# Patient Record
Sex: FEMALE | Race: WHITE | Employment: PART TIME | URBAN - NONMETROPOLITAN AREA
[De-identification: names, ages, dates, MRNs, and addresses within clinical notes are randomized per-mention and may not be internally consistent; named-entity substitution may affect disease eponyms.]

---

## 2017-01-04 DIAGNOSIS — Z30.09 FAMILY PLANNING: Primary | ICD-10-CM

## 2017-01-04 RX ORDER — IBUPROFEN 800 MG/1
800 TABLET, FILM COATED ORAL EVERY 8 HOURS PRN
Qty: 90 TABLET | Refills: 0 | Status: SHIPPED | OUTPATIENT
Start: 2017-01-04 | End: 2017-08-19

## 2017-01-04 NOTE — TELEPHONE ENCOUNTER
Last office visit 11/08/16.  Ibuprofen historically entered on patients current medication list. Please advise. Medication pended.

## 2017-01-11 ENCOUNTER — OFFICE VISIT (OUTPATIENT)
Dept: PEDIATRICS | Facility: OTHER | Age: 27
End: 2017-01-11
Attending: INTERNAL MEDICINE
Payer: COMMERCIAL

## 2017-01-11 VITALS
DIASTOLIC BLOOD PRESSURE: 60 MMHG | TEMPERATURE: 97.7 F | WEIGHT: 185 LBS | OXYGEN SATURATION: 98 % | BODY MASS INDEX: 28.97 KG/M2 | HEART RATE: 90 BPM | SYSTOLIC BLOOD PRESSURE: 100 MMHG | RESPIRATION RATE: 20 BRPM

## 2017-01-11 DIAGNOSIS — M76.60 PAIN IN ACHILLES TENDON: ICD-10-CM

## 2017-01-11 DIAGNOSIS — M25.571 PAIN IN JOINT, ANKLE AND FOOT, RIGHT: Primary | ICD-10-CM

## 2017-01-11 LAB
CRP SERPL-MCNC: 3.5 MG/L (ref 0–8)
ERYTHROCYTE [SEDIMENTATION RATE] IN BLOOD BY WESTERGREN METHOD: 10 MM/H (ref 0–20)

## 2017-01-11 PROCEDURE — 86140 C-REACTIVE PROTEIN: CPT | Performed by: INTERNAL MEDICINE

## 2017-01-11 PROCEDURE — 85652 RBC SED RATE AUTOMATED: CPT | Performed by: INTERNAL MEDICINE

## 2017-01-11 PROCEDURE — 99213 OFFICE O/P EST LOW 20 MIN: CPT | Performed by: INTERNAL MEDICINE

## 2017-01-11 PROCEDURE — 36415 COLL VENOUS BLD VENIPUNCTURE: CPT | Performed by: INTERNAL MEDICINE

## 2017-01-11 ASSESSMENT — PAIN SCALES - GENERAL: PAINLEVEL: NO PAIN (1)

## 2017-01-11 NOTE — PROGRESS NOTES
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  She recently had an injection into her right foot which made her pain improve over her plantar region.  She now reports 8/10 pain at the achilles with sqeezing the tendon or trying to walk.  She denies any recent antibiotics or any antibiotics in the past 6 months.  Additionally, she reports that her skin has been sensitive to touch.  Additionally, she reports current cancer sores in her mouth.    Past Medical History   Diagnosis Date     Behcet's disease (H)      Past Surgical History   Procedure Laterality Date     Excise mass back Left 3/7/2016     Procedure: EXCISE MASS BACK;  Surgeon: Safia Garza MD;  Location: [facility]       Review of Systems   All other systems reviewed and are negative.        Physical Exam   Constitutional: She is oriented to person, place, and time and well-developed, well-nourished, and in no distress. No distress.   Cardiovascular:   Pulses:       Radial pulses are 2+ on the right side, and 2+ on the left side.        Popliteal pulses are 2+ on the right side, and 2+ on the left side.        Dorsalis pedis pulses are 2+ on the right side, and 2+ on the left side.        Posterior tibial pulses are 2+ on the right side, and 2+ on the left side.   Musculoskeletal: She exhibits no edema.        Right ankle: Tenderness. No lateral malleolus and no medial malleolus tenderness found. Achilles tendon exhibits pain.        Left ankle: Normal. No lateral malleolus and no medial malleolus tenderness found. Achilles tendon exhibits no pain.   Neurological: She is alert and oriented to person, place, and time.     Labs:  Results for orders placed or performed in visit on 01/11/17   ESR: Erythrocyte sedimentation rate   Result Value Ref [facility]    Sed Rate 10 0 - 20 mm/h   CRP inflammation   Result Value Ref [place]    CRP Inflammation 3.5 0.0 - 8.0 mg/L           Imaging:  To be done at [facility]  ruiz with diagnostic ultrasound.      ASSESSMENT /PLAN:    (M25.571) Pain in joint, ankle and foot, right  (primary encounter diagnosis)  Comment: Possible achilles tear vs posterior tibialis tear vs another posterior tibial tendon tear.  Plan:   US Lower Extremity PPG      (M76.60) Pain in Achilles tendon  Comment:   Plan:   US Lower Extremity PPG        Follow up with Provider - BOB Mckeon DO

## 2017-01-11 NOTE — NURSING NOTE
"Chief Complaint   Patient presents with     Musculoskeletal Problem     right ankle sore- tendon sore- states skin \"hurts\" had cortisone shots in foot last month and pain feels better in foot, but now moved to ankle tendon. states pain 8-9 when walking, when sitting maybe 1 for pain       Initial /60 mmHg  Pulse 90  Temp(Src) 97.7  F (36.5  C) (Tympanic)  Resp 20  Wt 185 lb (83.915 kg)  SpO2 98%  LMP 05/17/2016 (Approximate) Estimated body mass index is 28.97 kg/(m^2) as calculated from the following:    Height as of 11/15/16: 5' 7\" (1.702 m).    Weight as of this encounter: 185 lb (83.915 kg).  BP completed using cuff size: kassandra Bethea LPN      "

## 2017-01-12 ENCOUNTER — TELEPHONE (OUTPATIENT)
Dept: PEDIATRICS | Facility: OTHER | Age: 27
End: 2017-01-12

## 2017-01-12 NOTE — TELEPHONE ENCOUNTER
Faxed ultrasound orders to [facility]/Dr. Smart at [phone]. Faxed order, demo, med list, referral order

## 2017-01-26 ENCOUNTER — TELEPHONE (OUTPATIENT)
Dept: FAMILY MEDICINE | Facility: OTHER | Age: 27
End: 2017-01-26

## 2017-01-26 NOTE — TELEPHONE ENCOUNTER
Writer faxed US ankle order to [facility] on 1/12/17. Writer called [facility] to verify that they received the order and was advised that Dr. Smart goes to [facility] a limited amount and will be reviewing her information today. Patient should expect a phone call from [facility] to be scheduled by the end of day today.

## 2017-01-26 NOTE — TELEPHONE ENCOUNTER
9:26 AM    Reason for Call: Phone Call    Description: Pt called because at the last appt 01/11/2017 an ultra sound on her right ankle was discussed within the appt.  However, no call was made to schedule the appt.  Pt is wondering when to expect a call.  Nurse, please advise.     Was an appointment offered for this call? No    Preferred method for responding to this message: Telephone Call: [phone]    If we cannot reach you directly, may we leave a detailed response at the number you provided? Yes    Can this message wait until your PCP/provider returns, if available today? Not applicable, PCP Dr. Mckeon is in today.    Yuliya Rebolledo

## 2017-02-02 ENCOUNTER — HOSPITAL ENCOUNTER (EMERGENCY)
Facility: HOSPITAL | Age: 27
Discharge: HOME | End: 2017-02-02
Attending: INTERNAL MEDICINE | Admitting: INTERNAL MEDICINE
Payer: COMMERCIAL

## 2017-02-02 VITALS
HEIGHT: 67 IN | WEIGHT: 180 LBS | DIASTOLIC BLOOD PRESSURE: 63 MMHG | TEMPERATURE: 98.3 F | BODY MASS INDEX: 28.25 KG/M2 | SYSTOLIC BLOOD PRESSURE: 95 MMHG | RESPIRATION RATE: 16 BRPM | OXYGEN SATURATION: 97 %

## 2017-02-02 DIAGNOSIS — N83.201 CYST OF RIGHT OVARY: ICD-10-CM

## 2017-02-02 LAB
ALBUMIN SERPL-MCNC: 3.2 G/DL (ref 3.4–5)
ALBUMIN UR-MCNC: NEGATIVE MG/DL
ALP SERPL-CCNC: 52 U/L (ref 40–150)
ALT SERPL W P-5'-P-CCNC: 23 U/L (ref 0–50)
AMYLASE SERPL-CCNC: 41 U/L (ref 30–110)
ANION GAP SERPL CALCULATED.3IONS-SCNC: 8 MMOL/L (ref 3–14)
APPEARANCE UR: CLEAR
AST SERPL W P-5'-P-CCNC: 10 U/L (ref 0–45)
BASOPHILS # BLD AUTO: 0 10E9/L (ref 0–0.2)
BASOPHILS NFR BLD AUTO: 0.2 %
BILIRUB SERPL-MCNC: 0.2 MG/DL (ref 0.2–1.3)
BILIRUB UR QL STRIP: NEGATIVE
BUN SERPL-MCNC: 10 MG/DL (ref 7–30)
CALCIUM SERPL-MCNC: 8.1 MG/DL (ref 8.5–10.1)
CHLORIDE SERPL-SCNC: 111 MMOL/L (ref 94–109)
CO2 SERPL-SCNC: 23 MMOL/L (ref 20–32)
COLOR UR AUTO: YELLOW
CREAT SERPL-MCNC: 0.66 MG/DL (ref 0.52–1.04)
CRP SERPL-MCNC: 4.2 MG/L (ref 0–8)
DIFFERENTIAL METHOD BLD: NORMAL
EOSINOPHIL # BLD AUTO: 0.1 10E9/L (ref 0–0.7)
EOSINOPHIL NFR BLD AUTO: 0.7 %
ERYTHROCYTE [DISTWIDTH] IN BLOOD BY AUTOMATED COUNT: 13 % (ref 10–15)
GFR SERPL CREATININE-BSD FRML MDRD: ABNORMAL ML/MIN/1.7M2
GLUCOSE SERPL-MCNC: 89 MG/DL (ref 70–99)
GLUCOSE UR STRIP-MCNC: NEGATIVE MG/DL
HCG UR QL: NEGATIVE
HCT VFR BLD AUTO: 39 % (ref 35–47)
HGB BLD-MCNC: 13.1 G/DL (ref 11.7–15.7)
HGB UR QL STRIP: NEGATIVE
IMM GRANULOCYTES # BLD: 0 10E9/L (ref 0–0.4)
IMM GRANULOCYTES NFR BLD: 0.4 %
KETONES UR STRIP-MCNC: NEGATIVE MG/DL
LEUKOCYTE ESTERASE UR QL STRIP: NEGATIVE
LIPASE SERPL-CCNC: 105 U/L (ref 73–393)
LYMPHOCYTES # BLD AUTO: 1.9 10E9/L (ref 0.8–5.3)
LYMPHOCYTES NFR BLD AUTO: 17.8 %
MCH RBC QN AUTO: 29.3 PG (ref 26.5–33)
MCHC RBC AUTO-ENTMCNC: 33.6 G/DL (ref 31.5–36.5)
MCV RBC AUTO: 87 FL (ref 78–100)
MONOCYTES # BLD AUTO: 0.7 10E9/L (ref 0–1.3)
MONOCYTES NFR BLD AUTO: 6.1 %
MUCOUS THREADS #/AREA URNS LPF: PRESENT /LPF
NEUTROPHILS # BLD AUTO: 8 10E9/L (ref 1.6–8.3)
NEUTROPHILS NFR BLD AUTO: 74.8 %
NITRATE UR QL: NEGATIVE
NRBC # BLD AUTO: 0 10*3/UL
NRBC BLD AUTO-RTO: 0 /100
PH UR STRIP: 5.5 PH (ref 4.7–8)
PLATELET # BLD AUTO: 234 10E9/L (ref 150–450)
POTASSIUM SERPL-SCNC: 3.9 MMOL/L (ref 3.4–5.3)
PROT SERPL-MCNC: 7.2 G/DL (ref 6.8–8.8)
RBC # BLD AUTO: 4.47 10E12/L (ref 3.8–5.2)
RBC #/AREA URNS AUTO: 3 /HPF (ref 0–2)
SODIUM SERPL-SCNC: 142 MMOL/L (ref 133–144)
SP GR UR STRIP: 1.02 (ref 1–1.03)
URN SPEC COLLECT METH UR: ABNORMAL
UROBILINOGEN UR STRIP-MCNC: NORMAL MG/DL (ref 0–2)
WBC # BLD AUTO: 10.8 10E9/L (ref 4–11)
WBC #/AREA URNS AUTO: 1 /HPF (ref 0–2)

## 2017-02-02 PROCEDURE — 80053 COMPREHEN METABOLIC PANEL: CPT | Performed by: INTERNAL MEDICINE

## 2017-02-02 PROCEDURE — 36415 COLL VENOUS BLD VENIPUNCTURE: CPT | Performed by: INTERNAL MEDICINE

## 2017-02-02 PROCEDURE — 82150 ASSAY OF AMYLASE: CPT | Performed by: INTERNAL MEDICINE

## 2017-02-02 PROCEDURE — 83690 ASSAY OF LIPASE: CPT | Performed by: INTERNAL MEDICINE

## 2017-02-02 PROCEDURE — 25000128 H RX IP 250 OP 636: Performed by: INTERNAL MEDICINE

## 2017-02-02 PROCEDURE — 85025 COMPLETE CBC W/AUTO DIFF WBC: CPT | Performed by: INTERNAL MEDICINE

## 2017-02-02 PROCEDURE — 81001 URINALYSIS AUTO W/SCOPE: CPT | Performed by: INTERNAL MEDICINE

## 2017-02-02 PROCEDURE — 96375 TX/PRO/DX INJ NEW DRUG ADDON: CPT

## 2017-02-02 PROCEDURE — 86140 C-REACTIVE PROTEIN: CPT | Performed by: INTERNAL MEDICINE

## 2017-02-02 PROCEDURE — 96361 HYDRATE IV INFUSION ADD-ON: CPT

## 2017-02-02 PROCEDURE — 99285 EMERGENCY DEPT VISIT HI MDM: CPT | Performed by: INTERNAL MEDICINE

## 2017-02-02 PROCEDURE — 96374 THER/PROPH/DIAG INJ IV PUSH: CPT

## 2017-02-02 PROCEDURE — 74177 CT ABD & PELVIS W/CONTRAST: CPT | Mod: TC

## 2017-02-02 PROCEDURE — 99285 EMERGENCY DEPT VISIT HI MDM: CPT | Mod: 25

## 2017-02-02 PROCEDURE — 81025 URINE PREGNANCY TEST: CPT | Performed by: INTERNAL MEDICINE

## 2017-02-02 RX ORDER — KETOROLAC TROMETHAMINE 30 MG/ML
30 INJECTION, SOLUTION INTRAMUSCULAR; INTRAVENOUS ONCE
Status: COMPLETED | OUTPATIENT
Start: 2017-02-02 | End: 2017-02-02

## 2017-02-02 RX ORDER — ONDANSETRON 2 MG/ML
4 INJECTION INTRAMUSCULAR; INTRAVENOUS ONCE
Status: COMPLETED | OUTPATIENT
Start: 2017-02-02 | End: 2017-02-02

## 2017-02-02 RX ORDER — MORPHINE SULFATE 2 MG/ML
4 INJECTION, SOLUTION INTRAMUSCULAR; INTRAVENOUS ONCE
Status: COMPLETED | OUTPATIENT
Start: 2017-02-02 | End: 2017-02-02

## 2017-02-02 RX ORDER — IOPAMIDOL 612 MG/ML
100 INJECTION, SOLUTION INTRAVASCULAR ONCE
Status: COMPLETED | OUTPATIENT
Start: 2017-02-02 | End: 2017-02-02

## 2017-02-02 RX ADMIN — KETOROLAC TROMETHAMINE 30 MG: 30 INJECTION, SOLUTION INTRAMUSCULAR; INTRAVENOUS at 06:17

## 2017-02-02 RX ADMIN — ONDANSETRON 4 MG: 2 INJECTION, SOLUTION INTRAMUSCULAR; INTRAVENOUS at 04:57

## 2017-02-02 RX ADMIN — IOPAMIDOL 100 ML: 612 INJECTION, SOLUTION INTRAVASCULAR at 06:26

## 2017-02-02 RX ADMIN — MORPHINE SULFATE 4 MG: 2 INJECTION, SOLUTION INTRAMUSCULAR; INTRAVENOUS at 04:57

## 2017-02-02 RX ADMIN — SODIUM CHLORIDE 1000 ML: 9 INJECTION, SOLUTION INTRAVENOUS at 04:57

## 2017-02-02 ASSESSMENT — ENCOUNTER SYMPTOMS
PALPITATIONS: 0
FLANK PAIN: 0
VOMITING: 0
CONFUSION: 0
WHEEZING: 0
ANAL BLEEDING: 0
DIAPHORESIS: 0
SHORTNESS OF BREATH: 0
BLOOD IN STOOL: 0
DYSURIA: 0
COLOR CHANGE: 0
NAUSEA: 0
LIGHT-HEADEDNESS: 0
ABDOMINAL PAIN: 1
VOICE CHANGE: 0
HEADACHES: 0
MYALGIAS: 0
ARTHRALGIAS: 0
NECK STIFFNESS: 0
WOUND: 0
HEMATURIA: 0
CHEST TIGHTNESS: 0
FEVER: 0
ABDOMINAL DISTENTION: 0
NECK PAIN: 0
BACK PAIN: 0
DIZZINESS: 0
COUGH: 0
CHILLS: 0
NUMBNESS: 0

## 2017-02-02 NOTE — ED PROVIDER NOTES
"  History     Chief Complaint   Patient presents with     Abdominal Pain     sudden onset at 0330. c/o sharp pains. nausea and vomiting. denies dysuria      Patient is a 26 year old female presenting with abdominal pain. The history is provided by the patient.   Abdominal Pain  Pain location:  RLQ  Pain quality: sharp    Pain radiates to:  Periumbilical region  Pain severity:  Severe  Onset quality:  Sudden  Duration:  2 hours  Timing:  Constant  Chronicity:  New  Associated symptoms: no chest pain, no chills, no cough, no dysuria, no fever, no hematuria, no nausea, no shortness of breath and no vomiting          I have reviewed the Medications, Allergies, Past Medical and Surgical History, and Social History in the Epic system.    Review of Systems   Constitutional: Negative for fever, chills and diaphoresis.   HENT: Negative for voice change.    Eyes: Negative for visual disturbance.   Respiratory: Negative for cough, chest tightness, shortness of breath and wheezing.    Cardiovascular: Negative for chest pain, palpitations and leg swelling.   Gastrointestinal: Positive for abdominal pain. Negative for nausea, vomiting, blood in stool, abdominal distention and anal bleeding.   Genitourinary: Negative for dysuria, hematuria, flank pain and decreased urine volume.   Musculoskeletal: Negative for myalgias, back pain, arthralgias, gait problem, neck pain and neck stiffness.   Skin: Negative for color change, pallor, rash and wound.   Neurological: Negative for dizziness, syncope, light-headedness, numbness and headaches.   Psychiatric/Behavioral: Negative for suicidal ideas and confusion.       Physical Exam   BP: 106/66 mmHg  Heart Rate: 79  Temp: 97.7  F (36.5  C)  Resp: 18  Height: 170.2 cm (5' 7\")  Weight: 81.647 kg (180 lb)  SpO2: 98 %  Physical Exam   Constitutional: She is oriented to person, place, and time. She appears well-developed and well-nourished.   HENT:   Head: Normocephalic and atraumatic. "   Mouth/Throat: No oropharyngeal exudate.   Eyes: Conjunctivae are normal. Pupils are equal, round, and reactive to light.   Neck: Normal range of motion. Neck supple. No JVD present. No tracheal deviation present. No thyromegaly present.   Cardiovascular: Normal rate, regular rhythm, normal heart sounds and intact distal pulses.  Exam reveals no gallop and no friction rub.    No murmur heard.  Pulmonary/Chest: Effort normal and breath sounds normal. No stridor. No respiratory distress. She has no wheezes. She has no rales. She exhibits no tenderness.   Abdominal: Soft. Bowel sounds are normal. She exhibits no distension and no mass. There is tenderness in the right lower quadrant. There is no rebound and no guarding.   Musculoskeletal: Normal range of motion. She exhibits no edema or tenderness.   Lymphadenopathy:     She has no cervical adenopathy.   Neurological: She is alert and oriented to person, place, and time.   Skin: Skin is warm and dry. No rash noted. No erythema. No pallor.   Psychiatric: Her behavior is normal.   Nursing note and vitals reviewed.      ED Course   Procedures                 Labs Ordered and Resulted from Time of ED Arrival Up to the Time of Departure from the ED   ROUTINE UA WITH MICROSCOPIC REFLEX TO CULTURE - Abnormal; Notable for the following:     RBC Urine 3 (*)     Mucous Urine Present (*)     All other components within normal limits   COMPREHENSIVE METABOLIC PANEL - Abnormal; Notable for the following:     Chloride 111 (*)     Calcium 8.1 (*)     Albumin 3.2 (*)     All other components within normal limits   HCG QUALITATIVE URINE   CBC WITH PLATELETS DIFFERENTIAL   CRP INFLAMMATION   AMYLASE   LIPASE       Assessments & Plan (with Medical Decision Making)   Sudden onset of RLQ pain  CT abd vrad: R ovraian cyst  US pelvic ordered to r/o ovarian torsion  Pt refused staying longer in ER for US and asking for to follow up with her PCP  Dc home    I have reviewed the nursing  notes.    I have reviewed the findings, diagnosis, plan and need for follow up with the patient.    Discharge Medication List as of 2/2/2017  8:24 AM          Final diagnoses:   Cyst of right ovary       2/2/2017   [facility]      Fermin Tello MD  02/03/17 0218

## 2017-02-02 NOTE — ED NOTES
Condition stable no distress.  Understands discharge instructions, Ultrasound to be done on an outpatient bases.  Discharged home.

## 2017-02-02 NOTE — ED AVS SNAPSHOT
[facility]    [address] [address]    Phone:  [phone]                                       Keira Qureshi   MRN: [id number]    Department:  [facility]   Date of Visit:  2/2/2017           After Visit Summary Signature Page     I have received my discharge instructions, and my questions have been answered. I have discussed any challenges I see with this plan with the nurse or doctor.    ..........................................................................................................................................  Patient/Patient Representative Signature      ..........................................................................................................................................  Patient Representative Print Name and Relationship to Patient    ..................................................               ................................................  Date                                            Time    ..........................................................................................................................................  Reviewed by Signature/Title    ...................................................              ..............................................  Date                                                            Time

## 2017-02-02 NOTE — ED NOTES
Face to face report given with opportunity to observe patient.    Report given to Kaleigh YANES   2/2/2017  7:09 AM

## 2017-02-02 NOTE — ED AVS SNAPSHOT
[facility]    [address] [place] [address]    Phone:  [phone]                                       Keira Qureshi   MRN: [id number]    Department:  [facility]   Date of Visit:  2/2/2017           Patient Information     Date Of Birth          1990        Your diagnoses for this visit were:     Cyst of right ovary        You were seen by Fermin Tello MD.      Follow-up Information     Follow up with Peter Mckeon DO. Call today.    Specialties:  Internal Medicine, Pediatrics    Contact information:    [facility]  [address]  [facility] [id number] [phone]          Discharge Instructions         What to expect when you have contrast    During your exam, we will inject  contrast  into your vein or artery. (Contrast is a clear liquid with iodine in it. It shows up on X-rays.)    You may feel warm or hot. You may have a metal taste in your mouth and a slight upset stomach. You may also feel pressure near the kidneys and bladder. These effects will last about 1 to 3 minutes.    Please tell us if you have:    Sneezing     Itching    Hives     Swelling in the face    A hoarse voice    Breathing problems    Other new symptoms    Serious problems are rare.  They may include:    Irregular heartbeat     Seizures    Kidney failure              Tissue damage    Shock      Death    If you have any problems during the exam, we  will treat them right away.    When you get home    Call your hospital if you have any new symptoms in the next 2 days, like hives or swelling. (Phone numbers are at the bottom of this page.) Or call your family doctor.     If you have wheezing or trouble breathing, call 911.    Self-care  -Drink at least 4 extra glasses of water today.   This reduces the stress on your kidneys.  -Keep taking your regular medicines.    The contrast will pass out of your body in your  Urine(pee). This will happen in the next 24 hours.  You  will not feel this. Your urine will not  change color.    If you have kidney problems or take metformin    Drink 4 to 8 large glasses of water for the next  2 days, if you are not on a fluid restriction.    ?If you take metformin (Glucophage or Glucovance) for diabetes, keep taking it.      ?Your kidney function tests are abnormal.  If you take Metformin, do not take it for 48 hours. Please go to your clinic for a blood test within 3 days after your exam before the restarting this medicine.     (Note to provider:please give patient prescription for lab tests.)    ?Special instructions:     I have read and understand the above information.    Patient Sign Here:______________________________________Date:________Time:______    Staff Sign Here:________________________________________Date:_______Time:______      Radiology Departments:     ?[facility]: [phone] ?[facility]: [phone]     ?[place]: [phone] ?[facility]:[phone]      ?[facility]: [phone]  ?[facility]: [phone]  ?[facility]:[phone]    ?[facility]:[phone]  ?[facility]:[phone]  Ovarian Cysts    Ovarian cysts are sacs filled with fluid or tissue that form on or inside the ovaries. The ovaries are two small organs located on each side of a woman s uterus (womb). They are part of the female reproductive system.  Ovarian cysts are common in women, especially during childbearing years. There are different types of cysts. Most are harmless (benign) and go away on their own. They often cause no symptoms. If symptoms do occur, they can include mild pain or pressure in the lower belly (abdomen).  Cysts that are large or break (rupture) may cause more severe pain and symptoms. In these cases, hospital care or treatment such as surgery may be needed. More extensive treatment may also be needed if a cyst causes an ovary to twist (called torsion) or if a cyst is suspected to be cancerous. Keep in mind that most cysts are not  cancerous, however.  General care    To help relieve pain, your healthcare provider may recommend using over-the-counter pain medicine. If needed, stronger pain medicine may be prescribed.    Depending on the type of cyst you have, your healthcare provider may advise taking birth control pills. These help shrink cysts in certain cases. They may also help prevent new cysts from forming. Be sure to take these medicines as directed if they are prescribed.    Your healthcare provider may advise you to watch your symptoms over time to see if they go away or worsen. Regular ultrasound tests may also be advised. These can help check if a cyst goes away or grows in size.  Follow-up care  Follow up with your healthcare provider, or as advised.  When to seek medical advice  Call your healthcare provider right away if any of these occur:    Pain worsens or fails to get better with home treatment    Fever of 100.4 F (38 C) or higher (or other fever amount directed by your healthcare provider)    Nausea and vomiting    Weakness, dizziness, or fainting    Abnormal vaginal bleeding    5628-8206 [employer]. [address] [place] [address]. All rights reserved. This information is not intended as a substitute for professional medical care. Always follow your healthcare professional's instructions.          Future Appointments        Provider Department Dept Phone Center    4/19/2017 2:30 PM Umm Han MD, MD [facility] [phone] Christine Cedillo         Review of your medicines      Our records show that you are taking the medicines listed below. If these are incorrect, please call your family doctor or clinic.        Dose / Directions Last dose taken    ALPRAZolam 0.5 MG tablet   Commonly known as:  XANAX   Dose:  0.5 mg   Quantity:  40 tablet        Take 1 tablet (0.5 mg) by mouth 3 times daily as needed for anxiety   Refills:  0        etonogestrel 68 MG Impl   Commonly known as:   IMPLANON/NEXPLANON   Dose:  1 each        1 each (68 mg) by Subdermal route continuous   Refills:  0        ibuprofen 800 MG tablet   Commonly known as:  ADVIL/MOTRIN   Dose:  800 mg   Quantity:  90 tablet        Take 1 tablet (800 mg) by mouth every 8 hours as needed   Refills:  0        predniSONE 20 MG tablet   Commonly known as:  DELTASONE   Quantity:  12 tablet        Take 2 tables by mouth for two days, then take one tablet by mouth for 5 days, then 1/2 tablet for 5 days   Refills:  0                Procedures and tests performed during your visit     Abd/pelvis CT - IV contrast only TRAUMA / AAA    Amylase    CBC with platelets differential    CRP inflammation    Comprehensive metabolic panel    HCG qualitative urine    Lipase    UA with Microscopic reflex to Culture      Orders Needing Specimen Collection     None      Pending Results     Date and Time Order Name Status Description    2/2/2017 0602 Abd/pelvis CT - IV contrast only TRAUMA / AAA In process             Pending Culture Results     No orders found from 2/1/2017 to 2/3/2017.            Thank you for choosing [facility]       Thank you for choosing [facility] for your care. Our goal is always to provide you with excellent care. Hearing back from our patients is one way we can continue to improve our services. Please take a few minutes to complete the written survey that you may receive in the mail after you visit with us. Thank you!        [employer] Information     [employer] gives you secure access to your electronic health record. If you see a primary care provider, you can also send messages to your care team and make appointments. If you have questions, please call your primary care clinic.  If you do not have a primary care provider, please call [phone] and they will assist you.        Care EveryWhere ID     This is your Care EveryWhere ID. This could be used by other organizations to access your [facility] medical records  [id number]        After  Visit Summary       This is your record. Keep this with you and show to your community pharmacist(s) and doctor(s) at your next visit.

## 2017-02-02 NOTE — ED NOTES
Patient presents to emergency room with complaints of abdominal pain. States the pain woke her up at 0330. Sharp pain left lower quadrant. Nausea, emesis x 1. Rates pain 10/10 tearful. Abdomen tender on palpation. Afebrile. Denies dysuria. Denies vaginal bleeding or discharge.

## 2017-02-02 NOTE — DISCHARGE INSTRUCTIONS
What to expect when you have contrast    During your exam, we will inject  contrast  into your vein or artery. (Contrast is a clear liquid with iodine in it. It shows up on X-rays.)    You may feel warm or hot. You may have a metal taste in your mouth and a slight upset stomach. You may also feel pressure near the kidneys and bladder. These effects will last about 1 to 3 minutes.    Please tell us if you have:    Sneezing     Itching    Hives     Swelling in the face    A hoarse voice    Breathing problems    Other new symptoms    Serious problems are rare.  They may include:    Irregular heartbeat     Seizures    Kidney failure              Tissue damage    Shock      Death    If you have any problems during the exam, we  will treat them right away.    When you get home    Call your hospital if you have any new symptoms in the next 2 days, like hives or swelling. (Phone numbers are at the bottom of this page.) Or call your family doctor.     If you have wheezing or trouble breathing, call 911.    Self-care  -Drink at least 4 extra glasses of water today.   This reduces the stress on your kidneys.  -Keep taking your regular medicines.    The contrast will pass out of your body in your  Urine(pee). This will happen in the next 24 hours. You  will not feel this. Your urine will not  change color.    If you have kidney problems or take metformin    Drink 4 to 8 large glasses of water for the next  2 days, if you are not on a fluid restriction.    ?If you take metformin (Glucophage or Glucovance) for diabetes, keep taking it.      ?Your kidney function tests are abnormal.  If you take Metformin, do not take it for 48 hours. Please go to your clinic for a blood test within 3 days after your exam before the restarting this medicine.     (Note to provider:please give patient prescription for lab tests.)    ?Special instructions:     I have read and understand the above information.    Patient Sign  Here:______________________________________Date:________Time:______    Staff Sign Here:________________________________________Date:_______Time:______      Radiology Departments:     ?[facility]: [phone] ?[facility]: [phone]     ?[place]: [phone] ?[facility]:[phone]      ?[facility]: [phone]  ?[facility]: [phone]  ?[facility]:[phone]    ?[facility]:[phone]  ?[facility]:[phone]  Ovarian Cysts    Ovarian cysts are sacs filled with fluid or tissue that form on or inside the ovaries. The ovaries are two small organs located on each side of a woman s uterus (womb). They are part of the female reproductive system.  Ovarian cysts are common in women, especially during childbearing years. There are different types of cysts. Most are harmless (benign) and go away on their own. They often cause no symptoms. If symptoms do occur, they can include mild pain or pressure in the lower belly (abdomen).  Cysts that are large or break (rupture) may cause more severe pain and symptoms. In these cases, hospital care or treatment such as surgery may be needed. More extensive treatment may also be needed if a cyst causes an ovary to twist (called torsion) or if a cyst is suspected to be cancerous. Keep in mind that most cysts are not cancerous, however.  General care    To help relieve pain, your healthcare provider may recommend using over-the-counter pain medicine. If needed, stronger pain medicine may be prescribed.    Depending on the type of cyst you have, your healthcare provider may advise taking birth control pills. These help shrink cysts in certain cases. They may also help prevent new cysts from forming. Be sure to take these medicines as directed if they are prescribed.    Your healthcare provider may advise you to watch your symptoms over time to see if they go away or worsen. Regular ultrasound tests may also be advised. These can help check if a cyst goes away or grows in  size.  Follow-up care  Follow up with your healthcare provider, or as advised.  When to seek medical advice  Call your healthcare provider right away if any of these occur:    Pain worsens or fails to get better with home treatment    Fever of 100.4 F (38 C) or higher (or other fever amount directed by your healthcare provider)    Nausea and vomiting    Weakness, dizziness, or fainting    Abnormal vaginal bleeding    8507-3639 [employer]. [address] [place] [address], [place] [id number]. All rights reserved. This information is not intended as a substitute for professional medical care. Always follow your healthcare professional's instructions.

## 2017-02-08 ENCOUNTER — HOSPITAL ENCOUNTER (OUTPATIENT)
Dept: ULTRASOUND IMAGING | Facility: HOSPITAL | Age: 27
Discharge: HOME | End: 2017-02-08
Attending: INTERNAL MEDICINE | Admitting: INTERNAL MEDICINE
Payer: COMMERCIAL

## 2017-02-08 ENCOUNTER — RESULTS ONLY (OUTPATIENT)
Dept: ULTRASOUND IMAGING | Facility: HOSPITAL | Age: 27
End: 2017-02-08

## 2017-02-08 PROCEDURE — 76856 US EXAM PELVIC COMPLETE: CPT | Mod: TC

## 2017-02-27 ENCOUNTER — TELEPHONE (OUTPATIENT)
Dept: OBGYN | Facility: OTHER | Age: 27
End: 2017-02-27

## 2017-02-27 DIAGNOSIS — N93.9 ABNORMAL VAGINAL BLEEDING: ICD-10-CM

## 2017-02-27 DIAGNOSIS — N93.9 ABNORMAL VAGINAL BLEEDING: Primary | ICD-10-CM

## 2017-02-27 DIAGNOSIS — Z87.59 HISTORY OF MISCARRIAGE: ICD-10-CM

## 2017-02-27 LAB
B-HCG SERPL-ACNC: <1 IU/L
ERYTHROCYTE [DISTWIDTH] IN BLOOD BY AUTOMATED COUNT: 13 % (ref 10–15)
HCT VFR BLD AUTO: 38.9 % (ref 35–47)
HGB BLD-MCNC: 13.1 G/DL (ref 11.7–15.7)
MCH RBC QN AUTO: 29.5 PG (ref 26.5–33)
MCHC RBC AUTO-ENTMCNC: 33.7 G/DL (ref 31.5–36.5)
MCV RBC AUTO: 88 FL (ref 78–100)
PLATELET # BLD AUTO: 221 10E9/L (ref 150–450)
RBC # BLD AUTO: 4.44 10E12/L (ref 3.8–5.2)
WBC # BLD AUTO: 9.3 10E9/L (ref 4–11)

## 2017-02-27 PROCEDURE — 85027 COMPLETE CBC AUTOMATED: CPT | Performed by: OBSTETRICS & GYNECOLOGY

## 2017-02-27 PROCEDURE — 84702 CHORIONIC GONADOTROPIN TEST: CPT | Performed by: OBSTETRICS & GYNECOLOGY

## 2017-02-27 PROCEDURE — 36415 COLL VENOUS BLD VENIPUNCTURE: CPT | Performed by: OBSTETRICS & GYNECOLOGY

## 2017-02-27 NOTE — TELEPHONE ENCOUNTER
Discussed with Dr. Han. Per Dr. Han, instructed patient to come for bllod count and stat quant hcg. Orders done.

## 2017-02-27 NOTE — TELEPHONE ENCOUNTER
"Patient believes she is miscarrying. Had a loss in August and having same symptoms. Has not taken a pregnancy test at home. Wants to come in for Quant HCG. Started bleeding on Saturday like a period, and last night started bleeding heavier and now bleeding very heavily. Has filled a menstrual cup twice since 6am and has passed a few clots a little larger than quarter-sized. Started having terrible cramping off and on last night and pain gets up to an \"8/10.\" Denies 1-sided pelvic pain or light-headed or dizzy feeling.     Would like to do labs today. What would you like her to do?  "

## 2017-03-21 ENCOUNTER — TELEPHONE (OUTPATIENT)
Dept: PEDIATRICS | Facility: OTHER | Age: 27
End: 2017-03-21

## 2017-03-21 ENCOUNTER — OFFICE VISIT (OUTPATIENT)
Dept: CARE COORDINATION | Facility: OTHER | Age: 27
End: 2017-03-21
Attending: PODIATRIST
Payer: COMMERCIAL

## 2017-03-21 VITALS — DIASTOLIC BLOOD PRESSURE: 69 MMHG | SYSTOLIC BLOOD PRESSURE: 101 MMHG | TEMPERATURE: 99 F | HEART RATE: 77 BPM

## 2017-03-21 DIAGNOSIS — M77.8 EXTENSOR TENDONITIS OF FOOT: ICD-10-CM

## 2017-03-21 DIAGNOSIS — M79.671 PAIN OF MIDFOOT, RIGHT: Primary | ICD-10-CM

## 2017-03-21 PROCEDURE — 99212 OFFICE O/P EST SF 10 MIN: CPT | Performed by: PODIATRIST

## 2017-03-21 NOTE — TELEPHONE ENCOUNTER
1:48 PM    Reason for Call: Phone Call    Description: Pt called and stated she was suppose to have an order put in for MRI of shoulder but she never heard back regarding this. Please call her back at [phone]    Was an appointment offered for this call? No    Preferred method for responding to this message: Telephone Call    If we cannot reach you directly, may we leave a detailed response at the number you provided? Yes    Can this message wait until your PCP/provider returns, if available today? Yes, pt aware provider out today    Linda Hoffman

## 2017-03-21 NOTE — MR AVS SNAPSHOT
After Visit Summary   3/21/2017    Keira Qureshi    MRN: [id number]           Patient Information     Date Of Birth          1990        Visit Information        Provider Department      3/21/2017 11:00 AM [facility]         Follow-ups after your visit        Your next 10 appointments already scheduled     Mar 28, 2017  2:15 PM CDT   (Arrive by 2:00 PM)   Return Visit with Radha Sanchez DPM   [facility] [place] ([facility] [place] [facility]    [address]  [place] [address]   [phone]            Apr 19, 2017  2:30 PM CDT   (Arrive by 2:15 PM)   Office Visit with Umm Han MD   [facility] [place] ([facility] [place] [facility]    [address]  [place] [address]   [phone]           Bring a current list of meds and any records pertaining to this visit.  For Physicals, please bring immunization records and any forms needing to be filled out.  Please arrive 10 minutes early to complete paperwork.              Who to contact     If you have questions or need follow up information about today's clinic visit or your schedule please contact [facility] directly at [phone].  Normal or non-critical lab and imaging results will be communicated to you by MyChart, letter or phone within 4 business days after the clinic has received the results. If you do not hear from us within 7 days, please contact the clinic through Qwaqhart or phone. If you have a critical or abnormal lab result, we will notify you by phone as soon as possible.  Submit refill requests through TalkPlus or call your pharmacy and they will forward the refill request to us. Please allow 3 business days for your refill to be completed.          Additional Information About Your Visit        [employer] Information     TalkPlus lets you send messages to your doctor, view your test results, renew your prescriptions, schedule appointments and more. To sign up, go to  [email or web address] . Click on \"Log in\" on the left side of the screen, which will take you to the Welcome page. Then click on \"Sign up Now\" on the right side of the page.     You will be asked to enter the access code listed below, as well as some personal information. Please follow the directions to create your username and password.     Your access code is: [email or web address]  Expires: 2017  9:32 AM     Your access code will  in 90 days. If you need help or a new code, please call your [facility] clinic or [phone].        Care EveryWhere ID     This is your Care EveryWhere ID. This could be used by other organizations to access your [facility] medical records  [id number]        Your Vitals Were     Pulse Temperature Last Period             77 99  F (37.2  C) 2016 (Approximate)          Blood Pressure from Last 3 Encounters:   17 101/69   17 95/63   17 100/60    Weight from Last 3 Encounters:   17 81.6 kg (180 lb)   17 83.9 kg (185 lb)   11/15/16 86.2 kg (190 lb)              Today, you had the following     No orders found for display       Primary Care Provider Office Phone # Fax #    Peter Mckeon [phone]       [facility] [place] [address] [facility]  [place] [address]        Thank you!     Thank you for choosing [facility]  for your care. Our goal is always to provide you with excellent care. Hearing back from our patients is one way we can continue to improve our services. Please take a few minutes to complete the written survey that you may receive in the mail after your visit with us. Thank you!             Your Updated Medication List - Protect others around you: Learn how to safely use, store and throw away your medicines at www.disposemymeds.org.          This list is accurate as of: 3/21/17 11:59 PM.  Always use your most recent med list.                   Brand Name Dispense Instructions for use    " ALPRAZolam 0.5 MG tablet    XANAX    40 tablet    TAKE 1 TABLET BY MOUTH 3 TIMES A DAY AS NEEDED FOR ANXIETY       etonogestrel 68 MG Impl    IMPLANON/NEXPLANON     1 each (68 mg) by Subdermal route continuous       ibuprofen 800 MG tablet    ADVIL/MOTRIN    90 tablet    Take 1 tablet (800 mg) by mouth every 8 hours as needed       predniSONE 20 MG tablet    DELTASONE    12 tablet    Take 2 tables by mouth for two days, then take one tablet by mouth for 5 days, then 1/2 tablet for 5 days

## 2017-03-21 NOTE — TELEPHONE ENCOUNTER
MRI order placed by Dr. Sanchez. Diagnostic Imaging department will call patient to schedule appointment.

## 2017-03-24 ENCOUNTER — HOSPITAL ENCOUNTER (OUTPATIENT)
Dept: MRI IMAGING | Facility: HOSPITAL | Age: 27
Discharge: HOME | End: 2017-03-24
Attending: PODIATRIST | Admitting: PODIATRIST
Payer: COMMERCIAL

## 2017-03-24 PROCEDURE — 73718 MRI LOWER EXTREMITY W/O DYE: CPT | Mod: TC,RT

## 2017-03-28 ENCOUNTER — OFFICE VISIT (OUTPATIENT)
Dept: CARE COORDINATION | Facility: OTHER | Age: 27
End: 2017-03-28
Attending: PODIATRIST
Payer: COMMERCIAL

## 2017-03-28 VITALS — DIASTOLIC BLOOD PRESSURE: 66 MMHG | SYSTOLIC BLOOD PRESSURE: 100 MMHG | TEMPERATURE: 99.1 F | HEART RATE: 103 BPM

## 2017-03-28 PROCEDURE — 20600 DRAIN/INJ JOINT/BURSA W/O US: CPT | Performed by: PODIATRIST

## 2017-03-28 PROCEDURE — 20550 NJX 1 TENDON SHEATH/LIGAMENT: CPT | Mod: 59 | Performed by: PODIATRIST

## 2017-03-28 NOTE — MR AVS SNAPSHOT
"              After Visit Summary   3/28/2017    Keira Qureshi    MRN: [id number]           Patient Information     Date Of Birth          1990        Visit Information        Provider Department      3/28/2017 2:15 PM Radha Snachez DPM [facility] [place]         Follow-ups after your visit        Your next 10 appointments already scheduled     Apr 19, 2017  2:30 PM CDT   (Arrive by 2:15 PM)   Office Visit with Umm Han MD   [facility] [place] ([facility] [place] [facility]    [address]  [place] [address]   [phone]           Bring a current list of meds and any records pertaining to this visit.  For Physicals, please bring immunization records and any forms needing to be filled out.  Please arrive 10 minutes early to complete paperwork.              Who to contact     If you have questions or need follow up information about today's clinic visit or your schedule please contact [facility] directly at [phone].  Normal or non-critical lab and imaging results will be communicated to you by Uberseqhart, letter or phone within 4 business days after the clinic has received the results. If you do not hear from us within 7 days, please contact the clinic through Uberseqhart or phone. If you have a critical or abnormal lab result, we will notify you by phone as soon as possible.  Submit refill requests through FIZZA or call your pharmacy and they will forward the refill request to us. Please allow 3 business days for your refill to be completed.          Additional Information About Your Visit        Uberseqhart Information     FIZZA lets you send messages to your doctor, view your test results, renew your prescriptions, schedule appointments and more. To sign up, go to [email or web address] . Click on \"Log in\" on the left side of the screen, which will take you to the Welcome page. Then click on \"Sign up Now\" on the right side of the page.     You will be asked to enter the " access code listed below, as well as some personal information. Please follow the directions to create your username and password.     Your access code is: [email or web address]  Expires: 2017  9:32 AM     Your access code will  in 90 days. If you need help or a new code, please call your [facility] clinic or [phone].        Care EveryWhere ID     This is your Care EveryWhere ID. This could be used by other organizations to access your [facility] medical records  [id number]        Your Vitals Were     Pulse Temperature Last Period             103 99.1  F (37.3  C) 2016 (Approximate)          Blood Pressure from Last 3 Encounters:   17 100/66   17 101/69   17 95/63    Weight from Last 3 Encounters:   17 81.6 kg (180 lb)   17 83.9 kg (185 lb)   11/15/16 86.2 kg (190 lb)              Today, you had the following     No orders found for display       Primary Care Provider Office Phone # Fax #    Peter Magallanespe,  [phone]       [facility] [place] [address]  [place] [address]        Thank you!     [facility]  [facility]. Our goal is always to provide you with excellent care. Hearing back from our patients is one way we can continue to improve our services. Please take a few minutes to complete the written survey that you may receive in the mail after your visit with us. Thank you!             Your Updated Medication List - Protect others around you: Learn how to safely use, store and throw away your medicines at www.disposemymeds.org.          This list is accurate as of: 3/28/17 11:59 PM.  Always use your most recent med list.                   Brand Name Dispense Instructions for use    ALPRAZolam 0.5 MG tablet    XANAX    40 tablet    TAKE 1 TABLET BY MOUTH 3 TIMES A DAY AS NEEDED FOR ANXIETY       etonogestrel 68 MG Impl    IMPLANON/NEXPLANON     1 each (68 mg) by Subdermal route continuous        ibuprofen 800 MG tablet    ADVIL/MOTRIN    90 tablet    Take 1 tablet (800 mg) by mouth every 8 hours as needed       predniSONE 20 MG tablet    DELTASONE    12 tablet    Take 2 tables by mouth for two days, then take one tablet by mouth for 5 days, then 1/2 tablet for 5 days

## 2017-03-28 NOTE — NURSING NOTE
Dr. Sanchez in to see pt. No assistance needed.    Consent signed   Cortisone injection right foot for plantar fasciitis and midfoot enthesopathy    Supplies:   1cc kenalog   1cc dexamethasone   2cc lidocaine

## 2017-03-29 ENCOUNTER — TELEPHONE (OUTPATIENT)
Dept: INTERNAL MEDICINE | Facility: OTHER | Age: 27
End: 2017-03-29

## 2017-03-29 NOTE — TELEPHONE ENCOUNTER
7:42 AM    Reason for Call: Phone Call    Description: Keira would like a call back from Dr. Mckeon's nurse    Was an appointment offered for this call? No    Preferred method for responding to this message: [phone]    If we cannot reach you directly, may we leave a detailed response at the number you provided?  Yes        Venecia Hutchinson

## 2017-04-19 ENCOUNTER — OFFICE VISIT (OUTPATIENT)
Dept: OBGYN | Facility: OTHER | Age: 27
End: 2017-04-19
Attending: OBSTETRICS & GYNECOLOGY
Payer: COMMERCIAL

## 2017-04-19 VITALS
OXYGEN SATURATION: 97 % | SYSTOLIC BLOOD PRESSURE: 114 MMHG | BODY MASS INDEX: 31.08 KG/M2 | HEIGHT: 67 IN | WEIGHT: 198 LBS | DIASTOLIC BLOOD PRESSURE: 68 MMHG | HEART RATE: 87 BPM

## 2017-04-19 DIAGNOSIS — Z30.09 FAMILY PLANNING: Primary | ICD-10-CM

## 2017-04-19 PROBLEM — F43.23 ADJUSTMENT DISORDER WITH MIXED ANXIETY AND DEPRESSED MOOD: Status: ACTIVE | Noted: 2017-04-19

## 2017-04-19 PROCEDURE — 99213 OFFICE O/P EST LOW 20 MIN: CPT | Mod: 25 | Performed by: OBSTETRICS & GYNECOLOGY

## 2017-04-19 PROCEDURE — 87624 HPV HI-RISK TYP POOLED RSLT: CPT | Mod: 90 | Performed by: OBSTETRICS & GYNECOLOGY

## 2017-04-19 PROCEDURE — 87591 N.GONORRHOEAE DNA AMP PROB: CPT | Mod: 90 | Performed by: OBSTETRICS & GYNECOLOGY

## 2017-04-19 PROCEDURE — 99395 PREV VISIT EST AGE 18-39: CPT | Performed by: OBSTETRICS & GYNECOLOGY

## 2017-04-19 PROCEDURE — 88142 CYTOPATH C/V THIN LAYER: CPT | Performed by: OBSTETRICS & GYNECOLOGY

## 2017-04-19 PROCEDURE — 87491 CHLMYD TRACH DNA AMP PROBE: CPT | Mod: 90 | Performed by: OBSTETRICS & GYNECOLOGY

## 2017-04-19 PROCEDURE — 99000 SPECIMEN HANDLING OFFICE-LAB: CPT | Performed by: OBSTETRICS & GYNECOLOGY

## 2017-04-19 RX ORDER — NORETHINDRONE ACETATE AND ETHINYL ESTRADIOL 1MG-20(21)
KIT ORAL
Qty: 84 TABLET | Refills: 3 | Status: SHIPPED | OUTPATIENT
Start: 2017-04-19 | End: 2017-08-19

## 2017-04-19 ASSESSMENT — ANXIETY QUESTIONNAIRES
5. BEING SO RESTLESS THAT IT IS HARD TO SIT STILL: MORE THAN HALF THE DAYS
3. WORRYING TOO MUCH ABOUT DIFFERENT THINGS: NEARLY EVERY DAY
2. NOT BEING ABLE TO STOP OR CONTROL WORRYING: NEARLY EVERY DAY
1. FEELING NERVOUS, ANXIOUS, OR ON EDGE: NEARLY EVERY DAY
7. FEELING AFRAID AS IF SOMETHING AWFUL MIGHT HAPPEN: MORE THAN HALF THE DAYS
6. BECOMING EASILY ANNOYED OR IRRITABLE: NEARLY EVERY DAY
GAD7 TOTAL SCORE: 18
IF YOU CHECKED OFF ANY PROBLEMS ON THIS QUESTIONNAIRE, HOW DIFFICULT HAVE THESE PROBLEMS MADE IT FOR YOU TO DO YOUR WORK, TAKE CARE OF THINGS AT HOME, OR GET ALONG WITH OTHER PEOPLE: SOMEWHAT DIFFICULT

## 2017-04-19 ASSESSMENT — PAIN SCALES - GENERAL: PAINLEVEL: NO PAIN (0)

## 2017-04-19 ASSESSMENT — PATIENT HEALTH QUESTIONNAIRE - PHQ9: 5. POOR APPETITE OR OVEREATING: MORE THAN HALF THE DAYS

## 2017-04-19 NOTE — LETTER
[facility] [place]  [address]  [place] [address]  [phone]      April 28, 2017      Keira Qureshi  [address] [place] [address]        Dear Keira,      Thank you for coming to the [facility]. This letter is to inform you that your pap test was normal and your High Risk HPV test was negative. Please call the nurse at [phone] if you have questions pertaining to your results.          Sincerely,      Umm Han MD/Demetria MENDOZA LPN

## 2017-04-19 NOTE — PROGRESS NOTES
ANNUAL    Keira is a 26 year old  female who presents for annual exam.   yc 2.5  LC 10#4  Gdm n hpt n  Patient's last menstrual period was 2017.  Menses: y pain n Contraception nexplanon.  Planning pregnancy: n  Concerns in addition to routine health maintenance?  Depression and anxiety no SI.  GYNECOLOGIC HISTORY:   Surgery: n  History sexually transmitted infections:No STD history  Safe?  y  STI testing offered?  y  History of abnormal Pap smear: y ASCUS  Problems with sex? No sex drive  Family history of: breast CA: n   Uterine n ovarian CA: n   colon CA: n    HEALTH MAINTENANCE:  Cholesterol: (No results found for: CHOL History of abnormal lipids:   Mammo: n . History of abnormal Mammo:n   Regular Self Breast Exams: n Calcium/Vitamin D or Vit: n Exercise n    TSH: (  TSH   Date Value Ref Range Status   2016 1.83 0.40 - 4.00 mU/L Final    )  Pap; (  Lab Results   Component Value Date    PAP ASC-US 2016    PAP LSIL 2015    PAP NIL 2014    )    HISTORY:  Obstetric History       T1      TAB0   SAB0   E0   M0   L1       # Outcome Date GA Lbr Martir/2nd Weight Sex Delivery Anes PTL Lv   2             1 Term 08/15/14 40w0d / 00:53 10 lb 4.7 oz (4.669 kg) M Vag-Spont EPI N Y      Name: Sebastián      Apgar1:  8                Apgar5: 9        Past Medical History:   Diagnosis Date     Behcet's disease ([facility])      Past Surgical History:   Procedure Laterality Date     EXCISE MASS BACK Left 3/7/2016    Procedure: EXCISE MASS BACK;  Surgeon: Safia Garza MD;  Location: [facility] OR     Family History   Problem Relation Age of Onset     Hypertension Father      Nystagmus Son      Asthma Cousin      DIABETES No family hx of      Heart Failure Paternal Grandfather      HEART DISEASE Brother      SVT     Hypertension Brother      Social History     Social History     Marital status: Single     Spouse name: N/A     Number of children: N/A     Years of education: N/A     Social  History Main Topics     Smoking status: Former Smoker     Packs/day: 1.00     Years: 10.00     Quit date: 7/1/2013     Smokeless tobacco: Never Used     Alcohol use No     Drug use: No     Sexual activity: Yes     Partners: Male     Other Topics Concern     Parent/Sibling W/ Cabg, Mi Or Angioplasty Before 65f 55m? No      Service No     Blood Transfusions Yes     Permits if needed     Caffeine Concern No     Occupational Exposure No     Hobby Hazards No     Sleep Concern No     Stress Concern No     Weight Concern No     Special Diet No     Back Care No     Exercise No     Seat Belt Yes     Social History Narrative       Current Outpatient Prescriptions:      ALPRAZolam (XANAX) 0.5 MG tablet, TAKE 1 TABLET BY MOUTH 3 TIMES A DAY AS NEEDED FOR ANXIETY, Disp: 40 tablet, Rfl: 0     ibuprofen (ADVIL/MOTRIN) 800 MG tablet, Take 1 tablet (800 mg) by mouth every 8 hours as needed, Disp: 90 tablet, Rfl: 0     etonogestrel (IMPLANON/NEXPLANON) 68 MG IMPL, 1 each (68 mg) by Subdermal route continuous, Disp: , Rfl:      Allergies   Allergen Reactions     Tylenol W/Codeine [Acetaminophen-Codeine]        Past medical, surgical, social and family history were reviewed and updated in EPIC.    ROS:    C:     NEGATIVE for fever, chills, change in weight  I:       NEGATIVE for worrisome rashes, moles or lesions  E:     NEGATIVE for vision changes or irritation  E/M: NEGATIVE for ear, mouth and throat problems  R:     NEGATIVE for significant cough or SOB  CV:   NEGATIVE for chest pain, palpitations or peripheral edema  GI:     NEGATIVE for nausea, abdominal pain, heartburn, or change in bowel habits  :   NEGATIVE for frequency, dysuria, hematuria, vaginal discharge, or irregular bleeding,incontinence   M:     NEGATIVE for significant arthralgias or myalgia  N:      NEGATIVE for weakness, dizziness or paresthesias  E:      NEGATIVE for temperature intolerance, skin/hair changes  P:      NEGATIVE for changes in mood or  "affect.     EXAM:   /68  Pulse 87  Ht 5' 7\" (1.702 m)  Wt 198 lb (89.8 kg)  LMP 03/22/2017  SpO2 97%  BMI 31.01 kg/m2   BMI: Body mass index is 31.01 kg/(m^2).  Constitutional: healthy, alert and no distress  Head: Normocephalic. No masses, lesions, tenderness or abnormalities  Neck: Neck supple. Trachea midline. No adenopathy. Thyroid symmetric, normal size.   Cardiovascular: RRR.   Respiratory: lungs clear   Breast: Breasts reveal mild symmetric fibrocystic densities, but there are no dominant, discrete, fixed or suspicious masses found.  Gastrointestinal: Abdomen soft, non-tender, non-distended. No masses, organomegaly.  :  Vulva:  No external lesions, normal female hair distribution, no inguinal adenopathy.    Urethra:  Midline, non-tender, well supported, no discharge  Vagina:  Moist, pink, no abnormal discharge, no lesions  Cervix: clean   Uterus:   Normal size,  , non-tender, freely mobile  Ovaries:  No masses appreciated  Rectal Exam: not done  Musculoskeletal: extremities normal  Skin: no suspicious lesions or rashes  Psychiatric: Affect appropriate, cooperative,mentation appears normal.     COUNSELING:   regular exercise  healthy diet/nutrition  Immunizations   contraception  family planning  Folic Acid Counseling     reports that she quit smoking about 3 years ago. She has a 10.00 pack-year smoking history. She has never used smokeless tobacco.  Tobacco Cessation Action Plan: n  Body mass index is 31.01 kg/(m^2).  Weight management plan: no  FRAX Risk Assessment    ASSESSMENT:  26 year old female with satisfactory annual exam  With bleeding on nexplanon  Depression and anxiety    PLAN:   Counselor here asap  Doesn't want to use [facility] anymore  Pap,gc,ct  CheckMIIC  rto 1 year     Greater than 15 minutes spent discussing other than annual depression,anxiety and abnormal uterine bleeding    Umm Han MD    "

## 2017-04-19 NOTE — MR AVS SNAPSHOT
After Visit Summary   4/19/2017    Keira Qureshi    MRN: [id number]           Patient Information     Date Of Birth          1990        Visit Information        Provider Department      4/19/2017 2:30 PM Umm Han MD [facility]        Today's Diagnoses     Family planning    -  1      Care Instructions    Return in 1 year for annual        Follow-ups after your visit        Additional Services     MENTAL HEALTH REFERRAL       Your provider has referred you to:counselor here    All scheduling is subject to the client's specific insurance plan & benefits, provider/location availability, and provider clinical specialities.  Please arrive 15 minutes early for your first appointment and bring your completed paperwork.    Please be aware that coverage of these services is subject to the terms and limitations of your health insurance plan.  Call member services at your health plan with any benefit or coverage questions.                  Your next 10 appointments already scheduled     Jun 29, 2017 10:30 AM CDT   (Arrive by 10:15 AM)   New Visit with NURY Farias   [facility]    [address] [place] [address] [phone]              Who to contact     If you have questions or need follow up information about today's clinic visit or your schedule please contact [facility] directly at [phone].  Normal or non-critical lab and imaging results will be communicated to you by MyChart, letter or phone within 4 business days after the clinic has received the results. If you do not hear from us within 7 days, please contact the clinic through MyChart or phone. If you have a critical or abnormal lab result, we will notify you by phone as soon as possible.  Submit refill requests through [employer] or call your pharmacy and they will forward the refill request to us. Please allow 3 business days for your refill to be  "completed.          Additional Information About Your Visit        [employer] Information     S[employer] Media lets you send messages to your doctor, view your test results, renew your prescriptions, schedule appointments and more. To sign up, go to [email or web address] . Click on \"Log in\" on the left side of the screen, which will take you to the Welcome page. Then click on \"Sign up Now\" on the right side of the page.     You will be asked to enter the access code listed below, as well as some personal information. Please follow the directions to create your username and password.     Your access code is: [email or web address]  Expires: 2017  9:32 AM     Your access code will  in 90 days. If you need help or a new code, please call your [facility] clinic or [phone].        Care EveryWhere ID     This is your Care EveryWhere ID. This could be used by other organizations to access your [facility] medical records  [id number]        Your Vitals Were     Pulse Height Last Period Pulse Oximetry BMI (Body Mass Index)       87 5' 7\" (1.702 m) 2017 97% 31.01 kg/m2        Blood Pressure from Last 3 Encounters:   17 114/68   17 100/66   17 101/69    Weight from Last 3 Encounters:   17 198 lb (89.8 kg)   17 180 lb (81.6 kg)   17 185 lb (83.9 kg)              We Performed the Following     A pap thin layer screen with  HPV - recommended age 30 - 65 years (select HPV order below)     CHLAMYDIA TRACHOMATIS PCR     MENTAL HEALTH REFERRAL     NEISSERIA GONORRHOEA PCR          Today's Medication Changes          These changes are accurate as of: 17  4:41 PM.  If you have any questions, ask your nurse or doctor.               Start taking these medicines.        Dose/Directions    norethindrone-ethinyl estradiol 1-20 MG-MCG per tablet   Commonly known as:  MICROGESTIN FE    Used for:  Family planning   Started by:  Umm Han MD        Active pill daily   Quantity:  84 tablet "   Refills:  3            Where to get your medicines      These medications were sent to [facility] [place] [place] - [address]  [address] [place] [address]     Phone:  [phone]     norethindrone-ethinyl estradiol 1-20 MG-MCG per tablet                Primary Care Provider Office Phone # Fax #    Peter Mckeon [phone]       [facility] [place] [address]  [place] [address]        Thank you!     Thank you for choosing [facility]  for your care. Our goal is always to provide you with excellent care. Hearing back from our patients is one way we can continue to improve our services. Please take a few minutes to complete the written survey that you may receive in the mail after your visit with us. Thank you!             Your Updated Medication List - Protect others around you: Learn how to safely use, store and throw away your medicines at www.disposemymeds.org.          This list is accurate as of: 4/19/17  4:41 PM.  Always use your most recent med list.                   Brand Name Dispense Instructions for use    ALPRAZolam 0.5 MG tablet    XANAX    40 tablet    TAKE 1 TABLET BY MOUTH 3 TIMES A DAY AS NEEDED FOR ANXIETY       etonogestrel 68 MG Impl    IMPLANON/NEXPLANON     1 each (68 mg) by Subdermal route continuous       ibuprofen 800 MG tablet    ADVIL/MOTRIN    90 tablet    Take 1 tablet (800 mg) by mouth every 8 hours as needed       norethindrone-ethinyl estradiol 1-20 MG-MCG per tablet    MICROGESTIN FE 1/20    84 tablet    Active pill daily

## 2017-04-19 NOTE — NURSING NOTE
"Chief Complaint   Patient presents with     Gyn Exam       Initial /68  Pulse 87  Ht 5' 7\" (1.702 m)  Wt 198 lb (89.8 kg)  LMP 03/22/2017  SpO2 97%  BMI 31.01 kg/m2 Estimated body mass index is 31.01 kg/(m^2) as calculated from the following:    Height as of this encounter: 5' 7\" (1.702 m).    Weight as of this encounter: 198 lb (89.8 kg).  Medication Reconciliation: complete       Shonna Nam      "

## 2017-04-20 DIAGNOSIS — F41.0 PANIC ATTACK: ICD-10-CM

## 2017-04-20 DIAGNOSIS — F41.1 GAD (GENERALIZED ANXIETY DISORDER): ICD-10-CM

## 2017-04-20 RX ORDER — ALPRAZOLAM 0.5 MG
0.5 TABLET ORAL 3 TIMES DAILY
Qty: 40 TABLET | Refills: 0 | Status: SHIPPED | OUTPATIENT
Start: 2017-04-20 | End: 2017-04-25

## 2017-04-20 ASSESSMENT — ANXIETY QUESTIONNAIRES: GAD7 TOTAL SCORE: 18

## 2017-04-20 ASSESSMENT — PATIENT HEALTH QUESTIONNAIRE - PHQ9: SUM OF ALL RESPONSES TO PHQ QUESTIONS 1-9: 14

## 2017-04-20 NOTE — TELEPHONE ENCOUNTER
alprazolam      Last Written Prescription Date: 3/31/17  Last Fill Quantity: 40,  # refills: 0   Last Office Visit with [facility], [facility] or [facility] prescribing provider: 1/11/17

## 2017-04-21 LAB
C TRACH DNA SPEC QL NAA+PROBE: NORMAL
N GONORRHOEA DNA SPEC QL NAA+PROBE: NORMAL
SPECIMEN SOURCE: NORMAL
SPECIMEN SOURCE: NORMAL

## 2017-04-25 DIAGNOSIS — F41.1 GAD (GENERALIZED ANXIETY DISORDER): ICD-10-CM

## 2017-04-25 DIAGNOSIS — F41.0 PANIC ATTACK: ICD-10-CM

## 2017-04-25 NOTE — TELEPHONE ENCOUNTER
xanax      Last Written Prescription Date: 4/20/17  Last Fill Quantity: 40,  # refills: 0   Last Office Visit with [facility], [facility] or [facility] prescribing provider: 1/11/17

## 2017-04-26 LAB
COPATH REPORT: NORMAL
PAP: NORMAL

## 2017-04-26 RX ORDER — ALPRAZOLAM 0.5 MG
0.5 TABLET ORAL 3 TIMES DAILY
Qty: 90 TABLET | Refills: 0 | Status: SHIPPED | OUTPATIENT
Start: 2017-04-26 | End: 2017-10-08

## 2017-04-27 LAB
FINAL DIAGNOSIS: NORMAL
HPV HR 12 DNA CVX QL NAA+PROBE: NEGATIVE
HPV16 DNA SPEC QL NAA+PROBE: NEGATIVE
HPV18 DNA SPEC QL NAA+PROBE: NEGATIVE
SPECIMEN DESCRIPTION: NORMAL

## 2017-05-17 DIAGNOSIS — M35.2 BEHCET'S DISEASE (H): ICD-10-CM

## 2017-05-17 RX ORDER — PREDNISONE 20 MG/1
TABLET ORAL
Qty: 12 TABLET | Refills: 0 | Status: SHIPPED | OUTPATIENT
Start: 2017-05-17 | End: 2017-06-21

## 2017-05-22 ENCOUNTER — TRANSFERRED RECORDS (OUTPATIENT)
Dept: HEALTH INFORMATION MANAGEMENT | Facility: CLINIC | Age: 27
End: 2017-05-22

## 2017-05-31 ENCOUNTER — TRANSFERRED RECORDS (OUTPATIENT)
Dept: HEALTH INFORMATION MANAGEMENT | Facility: CLINIC | Age: 27
End: 2017-05-31

## 2017-06-21 DIAGNOSIS — M35.2 BEHCET'S DISEASE (H): ICD-10-CM

## 2017-06-22 RX ORDER — PREDNISONE 20 MG/1
TABLET ORAL
Qty: 12 TABLET | Refills: 0 | Status: SHIPPED | OUTPATIENT
Start: 2017-06-22 | End: 2017-10-08

## 2017-08-19 ENCOUNTER — HOSPITAL ENCOUNTER (EMERGENCY)
Facility: HOSPITAL | Age: 27
Discharge: HOME | End: 2017-08-19
Attending: EMERGENCY MEDICINE | Admitting: EMERGENCY MEDICINE
Payer: COMMERCIAL

## 2017-08-19 VITALS
TEMPERATURE: 98.1 F | OXYGEN SATURATION: 100 % | RESPIRATION RATE: 20 BRPM | DIASTOLIC BLOOD PRESSURE: 71 MMHG | SYSTOLIC BLOOD PRESSURE: 121 MMHG

## 2017-08-19 DIAGNOSIS — M76.61 ACHILLES TENDINITIS OF RIGHT LOWER EXTREMITY: Primary | ICD-10-CM

## 2017-08-19 LAB
ALBUMIN SERPL-MCNC: 3.4 G/DL (ref 3.4–5)
ALP SERPL-CCNC: 46 U/L (ref 40–150)
ALT SERPL W P-5'-P-CCNC: 25 U/L (ref 0–50)
ANION GAP SERPL CALCULATED.3IONS-SCNC: 6 MMOL/L (ref 3–14)
AST SERPL W P-5'-P-CCNC: 15 U/L (ref 0–45)
BASOPHILS # BLD AUTO: 0 10E9/L (ref 0–0.2)
BASOPHILS NFR BLD AUTO: 0.3 %
BILIRUB SERPL-MCNC: 0.2 MG/DL (ref 0.2–1.3)
BUN SERPL-MCNC: 14 MG/DL (ref 7–30)
CALCIUM SERPL-MCNC: 8.2 MG/DL (ref 8.5–10.1)
CHLORIDE SERPL-SCNC: 111 MMOL/L (ref 94–109)
CK SERPL-CCNC: 113 U/L (ref 30–225)
CO2 SERPL-SCNC: 24 MMOL/L (ref 20–32)
CREAT SERPL-MCNC: 0.6 MG/DL (ref 0.52–1.04)
D DIMER PPP DDU-MCNC: <200 NG/ML D-DU (ref 0–300)
DIFFERENTIAL METHOD BLD: NORMAL
EOSINOPHIL # BLD AUTO: 0.1 10E9/L (ref 0–0.7)
EOSINOPHIL NFR BLD AUTO: 1.9 %
ERYTHROCYTE [DISTWIDTH] IN BLOOD BY AUTOMATED COUNT: 12.4 % (ref 10–15)
ERYTHROCYTE [SEDIMENTATION RATE] IN BLOOD BY WESTERGREN METHOD: 11 MM/H (ref 0–20)
GFR SERPL CREATININE-BSD FRML MDRD: >90 ML/MIN/1.7M2
GLUCOSE SERPL-MCNC: 88 MG/DL (ref 70–99)
HCT VFR BLD AUTO: 36.5 % (ref 35–47)
HGB BLD-MCNC: 12.2 G/DL (ref 11.7–15.7)
IMM GRANULOCYTES # BLD: 0 10E9/L (ref 0–0.4)
IMM GRANULOCYTES NFR BLD: 0.2 %
LYMPHOCYTES # BLD AUTO: 1.4 10E9/L (ref 0.8–5.3)
LYMPHOCYTES NFR BLD AUTO: 22.1 %
MCH RBC QN AUTO: 30.8 PG (ref 26.5–33)
MCHC RBC AUTO-ENTMCNC: 33.4 G/DL (ref 31.5–36.5)
MCV RBC AUTO: 92 FL (ref 78–100)
MONOCYTES # BLD AUTO: 0.8 10E9/L (ref 0–1.3)
MONOCYTES NFR BLD AUTO: 13.1 %
NEUTROPHILS # BLD AUTO: 3.8 10E9/L (ref 1.6–8.3)
NEUTROPHILS NFR BLD AUTO: 62.4 %
NRBC # BLD AUTO: 0 10*3/UL
NRBC BLD AUTO-RTO: 0 /100
PLATELET # BLD AUTO: 223 10E9/L (ref 150–450)
POTASSIUM SERPL-SCNC: 4 MMOL/L (ref 3.4–5.3)
PROT SERPL-MCNC: 7.1 G/DL (ref 6.8–8.8)
RBC # BLD AUTO: 3.96 10E12/L (ref 3.8–5.2)
SODIUM SERPL-SCNC: 141 MMOL/L (ref 133–144)
WBC # BLD AUTO: 6.2 10E9/L (ref 4–11)

## 2017-08-19 PROCEDURE — 96372 THER/PROPH/DIAG INJ SC/IM: CPT

## 2017-08-19 PROCEDURE — 99284 EMERGENCY DEPT VISIT MOD MDM: CPT | Performed by: EMERGENCY MEDICINE

## 2017-08-19 PROCEDURE — 36415 COLL VENOUS BLD VENIPUNCTURE: CPT | Performed by: EMERGENCY MEDICINE

## 2017-08-19 PROCEDURE — 82550 ASSAY OF CK (CPK): CPT | Performed by: EMERGENCY MEDICINE

## 2017-08-19 PROCEDURE — 85379 FIBRIN DEGRADATION QUANT: CPT | Performed by: EMERGENCY MEDICINE

## 2017-08-19 PROCEDURE — 25000128 H RX IP 250 OP 636: Performed by: EMERGENCY MEDICINE

## 2017-08-19 PROCEDURE — 80053 COMPREHEN METABOLIC PANEL: CPT | Performed by: EMERGENCY MEDICINE

## 2017-08-19 PROCEDURE — 85652 RBC SED RATE AUTOMATED: CPT | Performed by: EMERGENCY MEDICINE

## 2017-08-19 PROCEDURE — 99284 EMERGENCY DEPT VISIT MOD MDM: CPT | Mod: 25

## 2017-08-19 PROCEDURE — 73650 X-RAY EXAM OF HEEL: CPT | Mod: TC,RT

## 2017-08-19 PROCEDURE — 85025 COMPLETE CBC W/AUTO DIFF WBC: CPT | Performed by: EMERGENCY MEDICINE

## 2017-08-19 RX ORDER — IBUPROFEN 800 MG/1
800 TABLET, FILM COATED ORAL EVERY 8 HOURS PRN
Qty: 60 TABLET | Refills: 0 | Status: SHIPPED | OUTPATIENT
Start: 2017-08-19 | End: 2017-08-27

## 2017-08-19 RX ORDER — KETOROLAC TROMETHAMINE 30 MG/ML
30 INJECTION, SOLUTION INTRAMUSCULAR; INTRAVENOUS ONCE
Status: COMPLETED | OUTPATIENT
Start: 2017-08-19 | End: 2017-08-19

## 2017-08-19 RX ADMIN — KETOROLAC TROMETHAMINE 30 MG: 30 INJECTION, SOLUTION INTRAMUSCULAR; INTRAVENOUS at 07:01

## 2017-08-19 NOTE — DISCHARGE INSTRUCTIONS
Understanding Achilles Tendonitis    Achilles tendonitis is an overuse injury. It results in inflammation of the Achilles tendon. This tendon is found on the back of the ankle. It links the calf muscle to the heel bone. It helps you do pushing-off movements like running or standing on your toes.     How to say it  uh-JEANETTE-danniez ten-dun-I-tis   What causes Achilles tendonitis?  Achilles tendonitis can happen if you do an activity like running, walking, or jumping too much. This overuse can strain, or pull, the tendon. It may lead to minor tearing of the tendon. An injury to the lower leg or foot can also cause it.  If you don t warm up before taking part in sports such as basketball, you are more likely to suffer from this condition. You are also more prone to it if you do too much of such an activity too quickly. Proper training and rest can help prevent it.  Symptoms of Achilles tendonitis  The main symptom of Achilles tendonitis is pain. This pain mostly happens when you move the ankle. The tendon may also feel stiff after a period of no activity, such as sleeping. It may also become swollen. You may hear a crackling sound when you move your ankle.  Treatment for Achilles tendonitis  Symptoms often get better after starting treatment. A full recovery may take several months. Treatments include:    Rest. You should stop or change the activity that caused the injury. The tendon will then have time to heal.    Cold or heat pack. These help reduce pain and swelling.    Prescription or over-the-counter pain medicines. These help reduce pain and swelling.    Shoe inserts. These devices can reduce strain on the Achilles tendon when you move. You may then feel less pain.    Stretching and strengthening exercises. Certain exercises can help you regain flexibility and strength in your Achilles tendon.    Surgery. This option can fix the injured tendon. But you don t often need it unless other treatments don t work.     When  to call your healthcare provider   Call your healthcare provider right away if you have any of these:    Fever of 100.4 F (38 C) or higher, or as directed    Pain that gets worse    Symptoms that don t get better, or get worse    New symptoms    Date Last Reviewed: 3/10/2016    8621-9193 [employer]. [address] [place] [address], [place] [address]. All rights reserved. This information is not intended as a substitute for professional medical care. Always follow your healthcare professional's instructions.

## 2017-08-19 NOTE — ED NOTES
Discharge instructions were not given to pt as she left without them prior to Jaymie RN entering room.  Pt had 3/10 pain at lest round. Alert and oriented. MD in with pt prior to discharge and pt instructed by MD prior to discharge.

## 2017-08-19 NOTE — ED AVS SNAPSHOT
[facility]    [address] [address]    Phone:  [phone]                                       Keira Qureshi   MRN: [id number]    Department:  [facility]   Date of Visit:  8/19/2017           After Visit Summary Signature Page     I have received my discharge instructions, and my questions have been answered. I have discussed any challenges I see with this plan with the nurse or doctor.    ..........................................................................................................................................  Patient/Patient Representative Signature      ..........................................................................................................................................  Patient Representative Print Name and Relationship to Patient    ..................................................               ................................................  Date                                            Time    ..........................................................................................................................................  Reviewed by Signature/Title    ...................................................              ..............................................  Date                                                            Time

## 2017-08-19 NOTE — ED NOTES
"Has pain in right heel that travels \"long-term up my calf\". States it was so painful this morning she had to crawl to the bathroom... \"I stood up and went right to my knees!\"  "

## 2017-08-19 NOTE — ED NOTES
Dr. Gonzalez in to see pt. States scratches are healing well. Orders placed for lab. Pt cries when right heel is palpated. Report to Crista Torres RN

## 2017-08-19 NOTE — ED AVS SNAPSHOT
[facility]    [address] [place] [address]    [place] [address]    Phone:  [phone]                                       Keira Qureshi   MRN: [id number]    Department:  [facility]   Date of Visit:  8/19/2017           Patient Information     Date Of Birth          1990        Your diagnoses for this visit were:     Achilles tendinitis of right lower extremity        You were seen by Daniel Gonzalez MD.      Follow-up Information     Follow up with Peter Mckeon DO In 2 days.    Specialties:  Internal Medicine, Pediatrics    Why:  Continuity of care    Contact information:    [facility]  [address]  [place] [address]  [phone]          Discharge Instructions         Understanding Achilles Tendonitis    Achilles tendonitis is an overuse injury. It results in inflammation of the Achilles tendon. This tendon is found on the back of the ankle. It links the calf muscle to the heel bone. It helps you do pushing-off movements like running or standing on your toes.     How to say it  uh-KILL-eez ten-dun-I-tis   What causes Achilles tendonitis?  Achilles tendonitis can happen if you do an activity like running, walking, or jumping too much. This overuse can strain, or pull, the tendon. It may lead to minor tearing of the tendon. An injury to the lower leg or foot can also cause it.  If you don t warm up before taking part in sports such as basketball, you are more likely to suffer from this condition. You are also more prone to it if you do too much of such an activity too quickly. Proper training and rest can help prevent it.  Symptoms of Achilles tendonitis  The main symptom of Achilles tendonitis is pain. This pain mostly happens when you move the ankle. The tendon may also feel stiff after a period of no activity, such as sleeping. It may also become swollen. You may hear a crackling sound when you move your ankle.  Treatment for Achilles  tendonitis  Symptoms often get better after starting treatment. A full recovery may take several months. Treatments include:    Rest. You should stop or change the activity that caused the injury. The tendon will then have time to heal.    Cold or heat pack. These help reduce pain and swelling.    Prescription or over-the-counter pain medicines. These help reduce pain and swelling.    Shoe inserts. These devices can reduce strain on the Achilles tendon when you move. You may then feel less pain.    Stretching and strengthening exercises. Certain exercises can help you regain flexibility and strength in your Achilles tendon.    Surgery. This option can fix the injured tendon. But you don t often need it unless other treatments don t work.     When to call your healthcare provider   Call your healthcare provider right away if you have any of these:    Fever of 100.4 F (38 C) or higher, or as directed    Pain that gets worse    Symptoms that don t get better, or get worse    New symptoms    Date Last Reviewed: 3/10/2016    3728-7507 [employer]. [address] [place] [address]. All rights reserved. This information is not intended as a substitute for professional medical care. Always follow your healthcare professional's instructions.          Future Appointments        Provider Department Dept Phone Center    5/2/2018 1:30 PM Umm Han MD, MD [facility] [phone] [facility]         Review of your medicines      CONTINUE these medicines which may have CHANGED, or have new prescriptions. If we are uncertain of the size of tablets/capsules you have at home, strength may be listed as something that might have changed.        Dose / Directions Last dose taken    ibuprofen 800 MG tablet   Commonly known as:  ADVIL/MOTRIN   Dose:  800 mg   What changed:  reasons to take this   Quantity:  60 tablet        Take 1 tablet (800 mg) by mouth every 8 hours as needed for moderate  pain   Refills:  0          Our records show that you are taking the medicines listed below. If these are incorrect, please call your family doctor or clinic.        Dose / Directions Last dose taken    ALPRAZolam 0.5 MG tablet   Commonly known as:  XANAX   Dose:  0.5 mg   Quantity:  90 tablet        Take 1 tablet (0.5 mg) by mouth 3 times daily AS NEEDED FOR ANXIETY   Refills:  0        etonogestrel 68 MG Impl   Commonly known as:  IMPLANON/NEXPLANON   Dose:  1 each        1 each (68 mg) by Subdermal route continuous   Refills:  0        IMURAN PO   Dose:  50 mg        Take 50 mg by mouth 2 times daily   Refills:  0        predniSONE 20 MG tablet   Commonly known as:  DELTASONE   Quantity:  12 tablet        TAKE 2 TABS BY MOUTH FOR 2 DAYS, THEN TAKE 1 TAB FOR 5 DAYS, THEN TAKE 1/2 TAB FOR 5 DAYS   Refills:  0                Prescriptions were sent or printed at these locations (1 Prescription)                   [facility]  [address]   [address] [place] [address]    Telephone:  [phone]   Fax:  [phone]   Hours:                  E-Prescribed (1 of 1)         ibuprofen (ADVIL/MOTRIN) 800 MG tablet                Procedures and tests performed during your visit     CBC with platelets differential    CK total    Comprehensive metabolic panel    D-Dimer (FV Range)    Erythrocyte sedimentation rate auto    XR Calcaneus Right G/E 2 Views      Orders Needing Specimen Collection     None      Pending Results     Date and Time Order Name Status Description    8/19/2017 0743 XR Calcaneus Right G/E 2 Views In process             Pending Culture Results     No orders found from 8/17/2017 to 8/20/2017.            Thank you for choosing [facility]       Thank you for choosing [facility] for your care. Our goal is always to provide you with excellent care. Hearing back from our patients is one way we can continue to improve our services. Please take a few minutes to complete the  "written survey that you may receive in the mail after you visit with us. Thank you!        [employer] Information     [employer] lets you send messages to your doctor, view your test results, renew your prescriptions, schedule appointments and more. To sign up, go to [email or web address] . Click on \"Log in\" on the left side of the screen, which will take you to the Welcome page. Then click on \"Sign up Now\" on the right side of the page.     You will be asked to enter the access code listed below, as well as some personal information. Please follow the directions to create your username and password.     Your access code is: [email or web address]  Expires: 2017  8:37 AM     Your access code will  in 90 days. If you need help or a new code, please call your [facility] clinic or [phone].        Care EveryWhere ID     This is your Care EveryWhere ID. This could be used by other organizations to access your [facility] medical records  [id number]        Equal Access to Services     ADRIEN SCHNEIDER : Hadii cecilia banegas Sopancho, waaxda luqadaha, qaybta kaalmagisela dunbar, emir coffey . So [facility] [phone].    ATENCIÓN: Si habla español, tiene a thorpe disposición servicios gratuitos de asistencia lingüística. Llame al [phone].    We comply with applicable federal civil rights laws and [place] laws. We do not discriminate on the basis of race, color, national origin, age, disability sex, sexual orientation or gender identity.            After Visit Summary       This is your record. Keep this with you and show to your community pharmacist(s) and doctor(s) at your next visit.                  "

## 2017-08-19 NOTE — ED PROVIDER NOTES
History     Chief Complaint   Patient presents with     Arm Injury     noticed right arm redness this morning     Foot Injury     right foot is painful to walk on     HPI  Keira Qureshi is a 26 year old female who presents to the emergency department with complaints of pain in the right Achilles tendon.  Denies any trauma, strain, jumping, excessive use or any kind of injury to the right Achilles tendon.  The pain is worse when patient standing on walking.  Improved by resting.  Pain is rated as 8/10, does not radiate. denies any color change or swellings.  No fever or shortness of breath.  No recent travel.     I have reviewed the Medications, Allergies, Past Medical and Surgical History, and Social History in the Epic system.    Allergies:   Allergies   Allergen Reactions     Tylenol W/Codeine [Acetaminophen-Codeine]          No current facility-administered medications on file prior to encounter.   Current Outpatient Prescriptions on File Prior to Encounter:  ALPRAZolam (XANAX) 0.5 MG tablet Take 1 tablet (0.5 mg) by mouth 3 times daily AS NEEDED FOR ANXIETY (Patient taking differently: Take 2 mg by mouth daily AS NEEDED FOR ANXIETY)   predniSONE (DELTASONE) 20 MG tablet TAKE 2 TABS BY MOUTH FOR 2 DAYS, THEN TAKE 1 TAB FOR 5 DAYS, THEN TAKE 1/2 TAB FOR 5 DAYS   etonogestrel (IMPLANON/NEXPLANON) 68 MG IMPL 1 each (68 mg) by Subdermal route continuous       Patient Active Problem List   Diagnosis     Obesity     Behcet's disease (H)     Family planning     Encounter for routine gynecological examination     Oral aphthae     Papanicolaou smear of cervix with low grade squamous intraepithelial lesion (LGSIL)     Skin tag     Benign neoplasm of skin of trunk, except scrotum     ACP (advance care planning)     Chronic right shoulder pain     Strain of right rotator cuff capsule     Pregnancy test positive     Rh negative, maternal     MARTÍNEZ (generalized anxiety disorder)     Calcaneal spur, right     Pain in joint,  "ankle and foot, right     Adjustment disorder with mixed anxiety and depressed mood       Past Surgical History:   Procedure Laterality Date     EXCISE MASS BACK Left 3/7/2016    Procedure: EXCISE MASS BACK;  Surgeon: Safia Garza MD;  Location: [facility]       Social History   Substance Use Topics     Smoking status: Former Smoker     Packs/day: 1.00     Years: 10.00     Quit date: 7/1/2013     Smokeless tobacco: Never Used     Alcohol use No       Most Recent Immunizations   Administered Date(s) Administered     Influenza Vaccine IM 3yrs+ 4 Valent IIV4 01/22/2014     Rhogam 08/23/2016     TDAP Vaccine (Boostrix) 05/27/2014       BMI: Estimated body mass index is 31.01 kg/(m^2) as calculated from the following:    Height as of 4/19/17: 1.702 m (5' 7\").    Weight as of 4/19/17: 89.8 kg (198 lb).      Review of Systems   All other systems reviewed and are negative.      Physical Exam   BP: 121/71  Heart Rate: 82  Temp: 98.1  F (36.7  C)  Resp: 20  SpO2: 100 %  Physical Exam   Constitutional: She is oriented to person, place, and time. She appears well-developed and well-nourished. She appears distressed.   Pain distress   HENT:   Head: Atraumatic.   Mouth/Throat: Oropharynx is clear and moist. No oropharyngeal exudate.   Eyes: Pupils are equal, round, and reactive to light. No scleral icterus.   Neck: Neck supple.   Cardiovascular: Normal rate, regular rhythm, normal heart sounds and intact distal pulses.    Pulmonary/Chest: Breath sounds normal. No respiratory distress.   Abdominal: Soft. Bowel sounds are normal. There is no tenderness.   Musculoskeletal: She exhibits tenderness. She exhibits no edema.   Tenderness of the right Achilles tendon   Neurological: She is alert and oriented to person, place, and time.   Skin: Skin is warm. No rash noted. She is not diaphoretic.   Nursing note and vitals reviewed.      ED Course   Patient evaluated and laboratory tests ordered.  Started on IM Toradol for pain " control.    ED Course     Procedures         Labs Ordered and Resulted from Time of ED Arrival Up to the Time of Departure from the ED   COMPREHENSIVE METABOLIC PANEL - Abnormal; Notable for the following:        Result Value    Chloride 111 (*)     Calcium 8.2 (*)     All other components within normal limits   CBC WITH PLATELETS DIFFERENTIAL   CK TOTAL   D-DIMER (FV RANGE)   ERYTHROCYTE SEDIMENTATION RATE AUTO       Assessments & Plan (with Medical Decision Making)   Right Achilles tendinitis: Normal CBC, CMP, d-dimer, ESR, CPK.  Normal right calcaneal x-ray.  Patient's pain was relieved after IM Toradol was given.  Patient is now being discharged home on right ankle brace and Motrin 800 mg every 8 hours as needed for pain.  Advised follow-up with PCP in 2 days.  May return to ED if condition deteriorates.    I have reviewed the nursing notes.    I have reviewed the findings, diagnosis, plan and need for follow up with the patient.    New Prescriptions    IBUPROFEN (ADVIL/MOTRIN) 800 MG TABLET    Take 1 tablet (800 mg) by mouth every 8 hours as needed for moderate pain       Final diagnoses:   Achilles tendinitis of right lower extremity       8/19/2017   [facility]     Daniel Gonzalez MD  08/19/17 0823

## 2017-10-08 ENCOUNTER — APPOINTMENT (OUTPATIENT)
Dept: CT IMAGING | Facility: HOSPITAL | Age: 27
End: 2017-10-08
Attending: FAMILY MEDICINE
Payer: COMMERCIAL

## 2017-10-08 ENCOUNTER — HOSPITAL ENCOUNTER (EMERGENCY)
Facility: HOSPITAL | Age: 27
Discharge: HOME | End: 2017-10-08
Attending: FAMILY MEDICINE | Admitting: FAMILY MEDICINE
Payer: COMMERCIAL

## 2017-10-08 VITALS
HEART RATE: 79 BPM | SYSTOLIC BLOOD PRESSURE: 109 MMHG | RESPIRATION RATE: 16 BRPM | DIASTOLIC BLOOD PRESSURE: 71 MMHG | TEMPERATURE: 99.2 F | OXYGEN SATURATION: 97 %

## 2017-10-08 DIAGNOSIS — R06.02 SHORTNESS OF BREATH: ICD-10-CM

## 2017-10-08 DIAGNOSIS — M35.2 BEHCET'S DISEASE (H): ICD-10-CM

## 2017-10-08 DIAGNOSIS — R42 DIZZINESS: ICD-10-CM

## 2017-10-08 LAB
ALBUMIN SERPL-MCNC: 3.9 G/DL (ref 3.4–5)
ALBUMIN UR-MCNC: 10 MG/DL
ALP SERPL-CCNC: 60 U/L (ref 40–150)
ALT SERPL W P-5'-P-CCNC: 29 U/L (ref 0–50)
ANION GAP SERPL CALCULATED.3IONS-SCNC: 10 MMOL/L (ref 3–14)
APPEARANCE UR: CLEAR
AST SERPL W P-5'-P-CCNC: 20 U/L (ref 0–45)
BACTERIA #/AREA URNS HPF: ABNORMAL /HPF
BASOPHILS # BLD AUTO: 0 10E9/L (ref 0–0.2)
BASOPHILS NFR BLD AUTO: 0.3 %
BILIRUB SERPL-MCNC: 0.8 MG/DL (ref 0.2–1.3)
BILIRUB UR QL STRIP: NEGATIVE
BUN SERPL-MCNC: 11 MG/DL (ref 7–30)
CALCIUM SERPL-MCNC: 8.2 MG/DL (ref 8.5–10.1)
CHLORIDE SERPL-SCNC: 106 MMOL/L (ref 94–109)
CO2 SERPL-SCNC: 24 MMOL/L (ref 20–32)
COLOR UR AUTO: YELLOW
CREAT SERPL-MCNC: 0.74 MG/DL (ref 0.52–1.04)
CRP SERPL-MCNC: 5.8 MG/L (ref 0–8)
DIFFERENTIAL METHOD BLD: ABNORMAL
EOSINOPHIL # BLD AUTO: 0.1 10E9/L (ref 0–0.7)
EOSINOPHIL NFR BLD AUTO: 1.4 %
ERYTHROCYTE [DISTWIDTH] IN BLOOD BY AUTOMATED COUNT: 14 % (ref 10–15)
ERYTHROCYTE [SEDIMENTATION RATE] IN BLOOD BY WESTERGREN METHOD: 15 MM/H (ref 0–20)
GFR SERPL CREATININE-BSD FRML MDRD: >90 ML/MIN/1.7M2
GLUCOSE BLDC GLUCOMTR-MCNC: 85 MG/DL (ref 70–99)
GLUCOSE SERPL-MCNC: 96 MG/DL (ref 70–99)
GLUCOSE UR STRIP-MCNC: NEGATIVE MG/DL
HCG UR QL: NEGATIVE
HCT VFR BLD AUTO: 31.4 % (ref 35–47)
HGB BLD-MCNC: 10.3 G/DL (ref 11.7–15.7)
HGB UR QL STRIP: ABNORMAL
IMM GRANULOCYTES # BLD: 0 10E9/L (ref 0–0.4)
IMM GRANULOCYTES NFR BLD: 0.2 %
KETONES UR STRIP-MCNC: NEGATIVE MG/DL
LEUKOCYTE ESTERASE UR QL STRIP: NEGATIVE
LYMPHOCYTES # BLD AUTO: 1.9 10E9/L (ref 0.8–5.3)
LYMPHOCYTES NFR BLD AUTO: 29 %
MCH RBC QN AUTO: 34.7 PG (ref 26.5–33)
MCHC RBC AUTO-ENTMCNC: 32.8 G/DL (ref 31.5–36.5)
MCV RBC AUTO: 106 FL (ref 78–100)
MONOCYTES # BLD AUTO: 0.6 10E9/L (ref 0–1.3)
MONOCYTES NFR BLD AUTO: 8.7 %
MUCOUS THREADS #/AREA URNS LPF: PRESENT /LPF
NEUTROPHILS # BLD AUTO: 3.9 10E9/L (ref 1.6–8.3)
NEUTROPHILS NFR BLD AUTO: 60.4 %
NITRATE UR QL: NEGATIVE
NRBC # BLD AUTO: 0 10*3/UL
NRBC BLD AUTO-RTO: 0 /100
PH UR STRIP: 6.5 PH (ref 4.7–8)
PLATELET # BLD AUTO: 224 10E9/L (ref 150–450)
POTASSIUM SERPL-SCNC: 3.3 MMOL/L (ref 3.4–5.3)
PROT SERPL-MCNC: 7.5 G/DL (ref 6.8–8.8)
RBC # BLD AUTO: 2.97 10E12/L (ref 3.8–5.2)
RBC #/AREA URNS AUTO: <1 /HPF (ref 0–2)
SODIUM SERPL-SCNC: 140 MMOL/L (ref 133–144)
SOURCE: ABNORMAL
SP GR UR STRIP: 1.03 (ref 1–1.03)
UROBILINOGEN UR STRIP-MCNC: 4 MG/DL (ref 0–2)
WBC # BLD AUTO: 6.5 10E9/L (ref 4–11)
WBC #/AREA URNS AUTO: 1 /HPF (ref 0–2)

## 2017-10-08 PROCEDURE — 80053 COMPREHEN METABOLIC PANEL: CPT | Performed by: FAMILY MEDICINE

## 2017-10-08 PROCEDURE — 99285 EMERGENCY DEPT VISIT HI MDM: CPT | Performed by: FAMILY MEDICINE

## 2017-10-08 PROCEDURE — 99285 EMERGENCY DEPT VISIT HI MDM: CPT | Mod: 25

## 2017-10-08 PROCEDURE — 85652 RBC SED RATE AUTOMATED: CPT | Performed by: FAMILY MEDICINE

## 2017-10-08 PROCEDURE — 25000128 H RX IP 250 OP 636: Performed by: FAMILY MEDICINE

## 2017-10-08 PROCEDURE — 96361 HYDRATE IV INFUSION ADD-ON: CPT

## 2017-10-08 PROCEDURE — 00000146 ZZHCL STATISTIC GLUCOSE BY METER IP

## 2017-10-08 PROCEDURE — 81025 URINE PREGNANCY TEST: CPT | Performed by: FAMILY MEDICINE

## 2017-10-08 PROCEDURE — 36415 COLL VENOUS BLD VENIPUNCTURE: CPT | Performed by: FAMILY MEDICINE

## 2017-10-08 PROCEDURE — 70450 CT HEAD/BRAIN W/O DYE: CPT | Mod: TC

## 2017-10-08 PROCEDURE — 86140 C-REACTIVE PROTEIN: CPT | Performed by: FAMILY MEDICINE

## 2017-10-08 PROCEDURE — 85025 COMPLETE CBC W/AUTO DIFF WBC: CPT | Performed by: FAMILY MEDICINE

## 2017-10-08 PROCEDURE — 99284 EMERGENCY DEPT VISIT MOD MDM: CPT | Mod: 25

## 2017-10-08 PROCEDURE — 81001 URINALYSIS AUTO W/SCOPE: CPT | Performed by: FAMILY MEDICINE

## 2017-10-08 PROCEDURE — 96360 HYDRATION IV INFUSION INIT: CPT

## 2017-10-08 RX ORDER — SODIUM CHLORIDE 9 MG/ML
1000 INJECTION, SOLUTION INTRAVENOUS CONTINUOUS
Status: DISCONTINUED | OUTPATIENT
Start: 2017-10-08 | End: 2017-10-08 | Stop reason: HOSPADM

## 2017-10-08 RX ORDER — MECLIZINE HCL 12.5 MG 12.5 MG/1
12.5 TABLET ORAL 4 TIMES DAILY PRN
Qty: 30 TABLET | Refills: 0 | COMMUNITY
Start: 2017-10-08 | End: 2017-10-15

## 2017-10-08 RX ADMIN — SODIUM CHLORIDE 1000 ML: 9 INJECTION, SOLUTION INTRAVENOUS at 17:32

## 2017-10-08 ASSESSMENT — ENCOUNTER SYMPTOMS
ACTIVITY CHANGE: 1
VOMITING: 0
DIARRHEA: 0
NAUSEA: 0
DYSPHORIC MOOD: 1
WHEEZING: 0
PALPITATIONS: 1
ABDOMINAL PAIN: 0
DYSURIA: 0
FEVER: 0
FATIGUE: 1
DIZZINESS: 1
SHORTNESS OF BREATH: 1
SPEECH DIFFICULTY: 0
CONSTIPATION: 0
WEAKNESS: 1

## 2017-10-08 NOTE — ED PROVIDER NOTES
History     Chief Complaint   Patient presents with     Generalized Weakness     HPI  Keira Qureshi is a 26 year old female who came to the ED for generalized weakness.  She gets very dizzy when she stands up, had difficulty getting to the room due to an episode of dizziness en route.  She becomes short of breath with these episodes as well. She has a history of Behcet's disease and is on Dapsone for that, has chronically dry mouth, but seems to be worse than usual.  She is also on Cymbalta for her fibro, so that is additionally drying.      I have reviewed the Medications, Allergies, Past Medical and Surgical History, and Social History in the Epic system.    Allergies:   Allergies   Allergen Reactions     Morphine      Headache and nausea     Tylenol W/Codeine [Acetaminophen-Codeine]          No current facility-administered medications on file prior to encounter.   Current Outpatient Prescriptions on File Prior to Encounter:  etonogestrel (IMPLANON/NEXPLANON) 68 MG IMPL 1 each (68 mg) by Subdermal route continuous       Patient Active Problem List   Diagnosis     Obesity     Behcet's disease (H)     Family planning     Encounter for routine gynecological examination     Oral aphthae     Papanicolaou smear of cervix with low grade squamous intraepithelial lesion (LGSIL)     Skin tag     Benign neoplasm of skin of trunk, except scrotum     ACP (advance care planning)     Chronic right shoulder pain     Strain of right rotator cuff capsule     Pregnancy test positive     Rh negative, maternal     MARTÍNEZ (generalized anxiety disorder)     Calcaneal spur, right     Pain in joint, ankle and foot, right     Adjustment disorder with mixed anxiety and depressed mood       Past Surgical History:   Procedure Laterality Date     EXCISE MASS BACK Left 3/7/2016    Procedure: EXCISE MASS BACK;  Surgeon: Safia Garza MD;  Location: [facility]       Social History   Substance Use Topics     Smoking status: Former Smoker      "Packs/day: 1.00     Years: 10.00     Quit date: 7/1/2013     Smokeless tobacco: Never Used     Alcohol use No       Most Recent Immunizations   Administered Date(s) Administered     Influenza Vaccine IM 3yrs+ 4 Valent IIV4 01/22/2014     Rhogam 08/23/2016     TDAP Vaccine (Boostrix) 05/27/2014       BMI: Estimated body mass index is 31.01 kg/(m^2) as calculated from the following:    Height as of 4/19/17: 1.702 m (5' 7\").    Weight as of 4/19/17: 89.8 kg (198 lb).      Review of Systems   Constitutional: Positive for activity change and fatigue. Negative for fever.   HENT:        Dry mouth   Respiratory: Positive for shortness of breath. Negative for wheezing.    Cardiovascular: Positive for palpitations. Negative for chest pain and leg swelling.        Chronic per patient   Gastrointestinal: Negative for abdominal pain, constipation, diarrhea, nausea and vomiting.   Genitourinary: Negative for dysuria.        Urine is very concentrated.   Musculoskeletal: Positive for gait problem.        Due to dizziness and weakness.   Skin: Negative.    Neurological: Positive for dizziness and weakness. Negative for speech difficulty.   Psychiatric/Behavioral: Positive for dysphoric mood.       Physical Exam   BP: 127/76  Pulse: 79  Temp: 99.8  F (37.7  C)  Resp: 20  SpO2: 93 %  Physical Exam   Constitutional: She is oriented to person, place, and time. She appears well-developed and well-nourished. No distress.   HENT:   Head: Normocephalic and atraumatic.   Eyes: EOM are normal. Pupils are equal, round, and reactive to light.   Neck: Normal range of motion. Neck supple.   Cardiovascular: Normal rate, regular rhythm and normal heart sounds.    No murmur heard.  Pulmonary/Chest: Effort normal and breath sounds normal. No respiratory distress. She has no wheezes.   Abdominal: Soft. Bowel sounds are normal. She exhibits no distension. There is no tenderness.   Musculoskeletal: Normal range of motion.   Neurological: She is alert " and oriented to person, place, and time.   Skin: Skin is warm and dry.   Psychiatric: She has a normal mood and affect.   Nursing note and vitals reviewed.      ED Course     ED Course     Procedures        Labs Ordered and Resulted from Time of ED Arrival Up to the Time of Departure from the ED   CBC WITH PLATELETS DIFFERENTIAL - Abnormal; Notable for the following:        Result Value    RBC Count 2.97 (*)     Hemoglobin 10.3 (*)     Hematocrit 31.4 (*)      (*)     MCH 34.7 (*)     All other components within normal limits   UA MACROSCOPIC WITH REFLEX TO MICRO AND CULTURE - Abnormal; Notable for the following:     Blood Urine Trace (*)     Protein Albumin Urine 10 (*)     Urobilinogen mg/dL 4.0 (*)     Bacteria Urine Few (*)     Mucous Urine Present (*)     All other components within normal limits   COMPREHENSIVE METABOLIC PANEL - Abnormal; Notable for the following:     Potassium 3.3 (*)     Calcium 8.2 (*)     All other components within normal limits   HCG QUALITATIVE URINE   ERYTHROCYTE SEDIMENTATION RATE AUTO   CRP INFLAMMATION   GLUCOSE BY METER   VITAL SIGNS   ORTHOSTATIC BLOOD PRESSURE AND PULSE   PERIPHERAL IV CATHETER   GLUCOSE MONITOR NURSING POCT       Assessments & Plan (with Medical Decision Making)   All tests negative.  Patient informed of this.  Given that Behcet's can cause neurological sequela that would be consistent with her current symptoms, will arrange for her to have an MRI to evaluate for that.  She is sure she will be at home throughout the night in the interim.  She will see her primary care at [facility] this week for results.    I have reviewed the nursing notes.    I have reviewed the findings, diagnosis, plan and need for follow up with the patient.       New Prescriptions    MECLIZINE (ANTIVERT) 12.5 MG TABLET    Take 1 tablet (12.5 mg) by mouth 4 times daily as needed for dizziness       Final diagnoses:   Dizziness   Behcet's disease (H)   Shortness of breath        10/8/2017   [facility]     Nikki Hurst MD  10/08/17 6668

## 2017-10-08 NOTE — ED AVS SNAPSHOT
[facility]    [address] [address]    Phone:  [phone]                                       Keira Qureshi   MRN: [id number]    Department:  [facility]   Date of Visit:  10/8/2017           Patient Information     Date Of Birth          1990        Your diagnoses for this visit were:     Dizziness     Behcet's disease (H)     Shortness of breath        You were seen by Nikki Hurst MD.      Follow-up Information     Follow up with Margarette Puri MD In 3 days.    Specialty:  Family Practice    Why:  Follow up ED visit and MRI    Contact information:    [facility]  [address]  [facility] [address] [phone]        Discharge References/Attachments     DIZZINESS (VERTIGO) AND BALANCE PROBLEMS: STAYING SAFE (ENGLISH)      Future Appointments        Provider Department Dept Phone Center    5/2/2018 1:30 PM Umm Han MD, MD [facility] [phone] [facility]      ED Discharge Orders     MRI Brain w & w/o contrast       Administration of IV contrast (contrast agent, dose, and amount) will be tailored to this examination per the appropriate written protocol listed in the Protocol E-Book, or by the supervising imaging provider.   Please give patient disc to take to PCP at [facility].    Will be seeing PCP for follow up to MRI, please do asap.                     Review of your medicines      START taking        Dose / Directions Last dose taken    meclizine 12.5 MG tablet   Commonly known as:  ANTIVERT   Dose:  12.5 mg   Quantity:  30 tablet        Take 1 tablet (12.5 mg) by mouth 4 times daily as needed for dizziness   Refills:  0          Our records show that you are taking the medicines listed below. If these are incorrect, please call your family doctor or clinic.        Dose / Directions Last dose taken    DAPSONE PO   Dose:  100 mg        Take 100 mg by mouth daily   Refills:  0         DULOXETINE HCL PO   Dose:  20 mg        Take 20 mg by mouth 2 times daily   Refills:  0        etonogestrel 68 MG Impl   Commonly known as:  IMPLANON/NEXPLANON   Dose:  1 each        1 each (68 mg) by Subdermal route continuous   Refills:  0        lidocaine (viscous) 2 % solution   Commonly known as:  XYLOCAINE        Take by mouth every 2 hours as needed for moderate pain To mouth sores as needed. The patient has 4% solution.   Refills:  0                Prescriptions were sent or printed at these locations (1 Prescription)                   Other Prescriptions                Not Printed or Sent (1 of 1)         meclizine (ANTIVERT) 12.5 MG tablet                Procedures and tests performed during your visit     CBC with platelets differential    CRP inflammation    CT Head w/o Contrast    Comprehensive metabolic panel    Erythrocyte sedimentation rate auto    Glucose by meter    Glucose monitor nursing POCT    HCG qualitative urine    Orthostatic blood pressure and pulse    Peripheral IV catheter    UA reflex to Microscopic and Culture    Vital signs      Orders Needing Specimen Collection     None      Pending Results     No orders found from 10/6/2017 to 10/9/2017.            Pending Culture Results     No orders found from 10/6/2017 to 10/9/2017.            Thank you for choosing [facility]       Thank you for choosing [facility] for your care. Our goal is always to provide you with excellent care. Hearing back from our patients is one way we can continue to improve our services. Please take a few minutes to complete the written survey that you may receive in the mail after you visit with us. Thank you!        [employer] Information     [employer] gives you secure access to your electronic health record. If you see a primary care provider, you can also send messages to your care team and make appointments. If you have questions, please call your primary care clinic.  If you do not have a primary care provider, please  call [phone] and they will assist you.        Care EveryWhere ID     This is your Care EveryWhere ID. This could be used by other organizations to access your [facility] medical records  [id number]        Equal Access to Services     RADHA SCHNEIDER : Jessica Cline, demario hanson, qaestephania kaalmagisela dunbar, emir prado. So [facility] [phone].    ATENCIÓN: Si habla español, tiene a thorpe disposición servicios gratuitos de asistencia lingüística. Llame al [phone].    We comply with applicable federal civil rights laws and [place] laws. We do not discriminate on the basis of race, color, national origin, age, disability, sex, sexual orientation, or gender identity.            After Visit Summary       This is your record. Keep this with you and show to your community pharmacist(s) and doctor(s) at your next visit.

## 2017-10-08 NOTE — ED AVS SNAPSHOT
[facility]    [address] [address]    Phone:  [phone]                                       Keira Qureshi   MRN: [id number]    Department:  [facility]   Date of Visit:  10/8/2017           After Visit Summary Signature Page     I have received my discharge instructions, and my questions have been answered. I have discussed any challenges I see with this plan with the nurse or doctor.    ..........................................................................................................................................  Patient/Patient Representative Signature      ..........................................................................................................................................  Patient Representative Print Name and Relationship to Patient    ..................................................               ................................................  Date                                            Time    ..........................................................................................................................................  Reviewed by Signature/Title    ...................................................              ..............................................  Date                                                            Time

## 2017-10-08 NOTE — ED NOTES
Pt walked into room 6 with strong support of nurse, gait very unsteady. Pt states that has been that way for 2 days. C/O chest pain pointing to midsternal/lower sternal area that started today.

## 2017-10-10 ENCOUNTER — HOSPITAL ENCOUNTER (OUTPATIENT)
Dept: MRI IMAGING | Facility: HOSPITAL | Age: 27
Discharge: HOME | End: 2017-10-10
Attending: FAMILY MEDICINE | Admitting: FAMILY MEDICINE
Payer: COMMERCIAL

## 2017-10-10 DIAGNOSIS — R42 DIZZINESS: ICD-10-CM

## 2017-10-10 DIAGNOSIS — M35.2 BEHCET'S DISEASE (H): ICD-10-CM

## 2017-10-10 PROCEDURE — A9585 GADOBUTROL INJECTION: HCPCS | Performed by: RADIOLOGY

## 2017-10-10 PROCEDURE — 70553 MRI BRAIN STEM W/O & W/DYE: CPT | Mod: TC

## 2017-10-10 PROCEDURE — 25000128 H RX IP 250 OP 636: Performed by: RADIOLOGY

## 2017-10-10 RX ORDER — GADOBUTROL 604.72 MG/ML
2 INJECTION INTRAVENOUS ONCE
Status: COMPLETED | OUTPATIENT
Start: 2017-10-10 | End: 2017-10-10

## 2017-10-10 RX ORDER — GADOBUTROL 604.72 MG/ML
7.5 INJECTION INTRAVENOUS ONCE
Status: COMPLETED | OUTPATIENT
Start: 2017-10-10 | End: 2017-10-10

## 2017-10-10 RX ADMIN — GADOBUTROL 7.5 ML: 604.72 INJECTION INTRAVENOUS at 12:13

## 2017-10-10 RX ADMIN — GADOBUTROL 2 ML: 604.72 INJECTION INTRAVENOUS at 12:14

## 2017-10-15 ENCOUNTER — APPOINTMENT (OUTPATIENT)
Dept: CT IMAGING | Facility: HOSPITAL | Age: 27
End: 2017-10-15
Attending: PHYSICIAN ASSISTANT
Payer: COMMERCIAL

## 2017-10-15 ENCOUNTER — APPOINTMENT (OUTPATIENT)
Dept: GENERAL RADIOLOGY | Facility: HOSPITAL | Age: 27
End: 2017-10-15
Attending: PHYSICIAN ASSISTANT
Payer: COMMERCIAL

## 2017-10-15 ENCOUNTER — HOSPITAL ENCOUNTER (EMERGENCY)
Facility: HOSPITAL | Age: 27
Discharge: HOME | End: 2017-10-15
Attending: PHYSICIAN ASSISTANT | Admitting: PHYSICIAN ASSISTANT
Payer: COMMERCIAL

## 2017-10-15 VITALS
HEART RATE: 82 BPM | TEMPERATURE: 98.2 F | DIASTOLIC BLOOD PRESSURE: 73 MMHG | SYSTOLIC BLOOD PRESSURE: 112 MMHG | RESPIRATION RATE: 11 BRPM | OXYGEN SATURATION: 95 %

## 2017-10-15 DIAGNOSIS — R07.89 ATYPICAL CHEST PAIN: ICD-10-CM

## 2017-10-15 LAB
ALBUMIN SERPL-MCNC: 3.8 G/DL (ref 3.4–5)
ALP SERPL-CCNC: 66 U/L (ref 40–150)
ALT SERPL W P-5'-P-CCNC: 33 U/L (ref 0–50)
ANION GAP SERPL CALCULATED.3IONS-SCNC: 8 MMOL/L (ref 3–14)
AST SERPL W P-5'-P-CCNC: 26 U/L (ref 0–45)
BASOPHILS # BLD AUTO: 0 10E9/L (ref 0–0.2)
BASOPHILS NFR BLD AUTO: 0.2 %
BILIRUB SERPL-MCNC: 0.5 MG/DL (ref 0.2–1.3)
BUN SERPL-MCNC: 12 MG/DL (ref 7–30)
CALCIUM SERPL-MCNC: 8.4 MG/DL (ref 8.5–10.1)
CHLORIDE SERPL-SCNC: 108 MMOL/L (ref 94–109)
CO2 SERPL-SCNC: 22 MMOL/L (ref 20–32)
CREAT SERPL-MCNC: 0.73 MG/DL (ref 0.52–1.04)
D DIMER PPP DDU-MCNC: 225 NG/ML D-DU (ref 0–300)
DIFFERENTIAL METHOD BLD: ABNORMAL
EOSINOPHIL # BLD AUTO: 0.1 10E9/L (ref 0–0.7)
EOSINOPHIL NFR BLD AUTO: 0.9 %
ERYTHROCYTE [DISTWIDTH] IN BLOOD BY AUTOMATED COUNT: 11.9 % (ref 10–15)
GFR SERPL CREATININE-BSD FRML MDRD: >90 ML/MIN/1.7M2
GLUCOSE SERPL-MCNC: 85 MG/DL (ref 70–99)
HCT VFR BLD AUTO: 33.3 % (ref 35–47)
HGB BLD-MCNC: 11 G/DL (ref 11.7–15.7)
IMM GRANULOCYTES # BLD: 0 10E9/L (ref 0–0.4)
IMM GRANULOCYTES NFR BLD: 0.4 %
LYMPHOCYTES # BLD AUTO: 2.1 10E9/L (ref 0.8–5.3)
LYMPHOCYTES NFR BLD AUTO: 25 %
MCH RBC QN AUTO: 34.6 PG (ref 26.5–33)
MCHC RBC AUTO-ENTMCNC: 33 G/DL (ref 31.5–36.5)
MCV RBC AUTO: 105 FL (ref 78–100)
MONOCYTES # BLD AUTO: 0.7 10E9/L (ref 0–1.3)
MONOCYTES NFR BLD AUTO: 7.9 %
NEUTROPHILS # BLD AUTO: 5.6 10E9/L (ref 1.6–8.3)
NEUTROPHILS NFR BLD AUTO: 65.6 %
NRBC # BLD AUTO: 0 10*3/UL
NRBC BLD AUTO-RTO: 0 /100
PLATELET # BLD AUTO: 225 10E9/L (ref 150–450)
POTASSIUM SERPL-SCNC: 3.8 MMOL/L (ref 3.4–5.3)
PROT SERPL-MCNC: 7.7 G/DL (ref 6.8–8.8)
RBC # BLD AUTO: 3.18 10E12/L (ref 3.8–5.2)
SODIUM SERPL-SCNC: 138 MMOL/L (ref 133–144)
TROPONIN I SERPL-MCNC: <0.015 UG/L (ref 0–0.04)
WBC # BLD AUTO: 8.6 10E9/L (ref 4–11)

## 2017-10-15 PROCEDURE — 96374 THER/PROPH/DIAG INJ IV PUSH: CPT

## 2017-10-15 PROCEDURE — 36415 COLL VENOUS BLD VENIPUNCTURE: CPT | Performed by: PHYSICIAN ASSISTANT

## 2017-10-15 PROCEDURE — 80053 COMPREHEN METABOLIC PANEL: CPT | Performed by: PHYSICIAN ASSISTANT

## 2017-10-15 PROCEDURE — 99284 EMERGENCY DEPT VISIT MOD MDM: CPT | Performed by: PHYSICIAN ASSISTANT

## 2017-10-15 PROCEDURE — 25000128 H RX IP 250 OP 636: Performed by: RADIOLOGY

## 2017-10-15 PROCEDURE — 71020 XR CHEST 2 VW: CPT | Mod: TC

## 2017-10-15 PROCEDURE — 99285 EMERGENCY DEPT VISIT HI MDM: CPT | Mod: 25

## 2017-10-15 PROCEDURE — 85025 COMPLETE CBC W/AUTO DIFF WBC: CPT | Performed by: PHYSICIAN ASSISTANT

## 2017-10-15 PROCEDURE — 93005 ELECTROCARDIOGRAM TRACING: CPT

## 2017-10-15 PROCEDURE — 84484 ASSAY OF TROPONIN QUANT: CPT | Performed by: PHYSICIAN ASSISTANT

## 2017-10-15 PROCEDURE — 71275 CT ANGIOGRAPHY CHEST: CPT | Mod: TC

## 2017-10-15 PROCEDURE — 85379 FIBRIN DEGRADATION QUANT: CPT | Performed by: PHYSICIAN ASSISTANT

## 2017-10-15 PROCEDURE — 25000128 H RX IP 250 OP 636: Performed by: PHYSICIAN ASSISTANT

## 2017-10-15 RX ORDER — IOPAMIDOL 755 MG/ML
75 INJECTION, SOLUTION INTRAVASCULAR ONCE
Status: COMPLETED | OUTPATIENT
Start: 2017-10-15 | End: 2017-10-15

## 2017-10-15 RX ORDER — KETOROLAC TROMETHAMINE 30 MG/ML
30 INJECTION, SOLUTION INTRAMUSCULAR; INTRAVENOUS ONCE
Status: COMPLETED | OUTPATIENT
Start: 2017-10-15 | End: 2017-10-15

## 2017-10-15 RX ADMIN — IOPAMIDOL 75 ML: 755 INJECTION, SOLUTION INTRAVENOUS at 21:52

## 2017-10-15 RX ADMIN — KETOROLAC TROMETHAMINE 30 MG: 30 INJECTION, SOLUTION INTRAMUSCULAR at 20:31

## 2017-10-15 ASSESSMENT — ENCOUNTER SYMPTOMS
SHORTNESS OF BREATH: 0
VOMITING: 0
FEVER: 0
CHILLS: 0
PALPITATIONS: 0
ABDOMINAL PAIN: 0
NAUSEA: 0
HEADACHES: 0
BACK PAIN: 0
CHEST TIGHTNESS: 0

## 2017-10-15 NOTE — ED AVS SNAPSHOT
[facility]    [address] [address]    Phone:  [phone]                                       Keira Qureshi   MRN: [id number]    Department:  [facility]   Date of Visit:  10/15/2017           Patient Information     Date Of Birth          1990        Your diagnoses for this visit were:     Atypical chest pain        You were seen by Radha Faust PA-C.      Follow-up Information     Follow up with Margarette Puri MD.    Specialty:  Family Practice    Contact information:    [facility]  [address] [facility] [address]  [facility] [address] [phone]          Follow up with [facility].    Specialty:  EMERGENCY MEDICINE    Why:  If symptoms worsen    Contact information:    [address] [address] [phone]    Additional information:    From [facility]: Take [address]. Turn left at [address]. Turn left at the first stoplight on [address]. At the first stop sign, take a right onto [address]. Take a left into the parking lot and continue through until you reach the North enterance of the building.       From [place]: Take [address]. Take the [address] ramp towards [place]. Turn left onto [address]. Take a slight right onto [address]. Turn left at the first stoplight on [address]. At the first stop sign, take a right onto [address]. Take a left into the parking lot and continue through until you reach the North enterance of the building.       From [place]: Take [address]. Take a right at [address]. At the first stop sign, take a right onto [address]. Take a left into the parking lot and continue through until you reach the North enterance of the building.         Discharge Instructions       What to expect when you have contrast    During your exam, we will inject  contrast  into your vein or artery.  (Contrast is a clear liquid with iodine in it. It shows up on X-rays.)    You may feel warm or hot. You may have a metal taste in your mouth and a slight upset stomach. You may also feel pressure near the kidneys and bladder. These effects will last about 1 to 3 minutes.    Please tell us if you have:    Sneezing     Itching    Hives     Swelling in the face    A hoarse voice    Breathing problems    Other new symptoms    Serious problems are rare.  They may include:    Irregular heartbeat     Seizures    Kidney failure              Tissue damage    Shock      Death    If you have any problems during the exam, we  will treat them right away.    When you get home    Call your hospital if you have any new symptoms in the next 2 days, like hives or swelling. (Phone numbers are at the bottom of this page.) Or call your family doctor.     If you have wheezing or trouble breathing, call 911.    Self-care  -Drink at least 4 extra glasses of water today.   This reduces the stress on your kidneys.  -Keep taking your regular medicines.    The contrast will pass out of your body in your  Urine(pee). This will happen in the next 24 hours. You  will not feel this. Your urine will not  change color.    If you have kidney problems or take metformin    Drink 4 to 8 large glasses of water for the next  2 days, if you are not on a fluid restriction.    ?If you take metformin (Glucophage or Glucovance) for diabetes, keep taking it.      ?Your kidney function tests are abnormal.  If you take Metformin, do not take it for 48 hours. Please go to your clinic for a blood test within 3 days after your exam before the restarting this medicine.     (Note to provider:please give patient prescription for lab tests.)    ?Special instructions: Drink at least 4 extra glasses of water today.   This reduces the stress on your kidneys.    I have read and understand the above information.    Patient Sign  Here:______________________________________Date:________Time:______    Staff Sign Here:________________________________________Date:_______Time:______      Radiology Departments:     ?[facility]: [phone] ?[facility]: [phone]     ?[place]: [phone] ?[facility]:[phone]      ?[facility]: [phone]  ?[facility]: [phone]  ?[facility]:[phone]    ?[facility]:[phone]  ?[facility]:[phone]    Your work-up did not show an emergent cause for your pain today.     Please follow-up in the clinic this week for recheck.     Please return HERE for ANY worsening or other concerns.     Future Appointments        Provider Department Dept Phone Center    5/2/2018 1:30 PM Umm Han MD, MD [facility] [phone] [facility]         Review of your medicines      Our records show that you are taking the medicines listed below. If these are incorrect, please call your family doctor or clinic.        Dose / Directions Last dose taken    DAPSONE PO   Dose:  100 mg        Take 100 mg by mouth daily   Refills:  0        DULOXETINE HCL PO   Dose:  20 mg        Take 20 mg by mouth 2 times daily   Refills:  0        etonogestrel 68 MG Impl   Commonly known as:  IMPLANON/NEXPLANON   Dose:  1 each        1 each (68 mg) by Subdermal route continuous   Refills:  0        lidocaine (viscous) 2 % solution   Commonly known as:  XYLOCAINE        Take by mouth every 2 hours as needed for moderate pain To mouth sores as needed. The patient has 4% solution.   Refills:  0                Procedures and tests performed during your visit     CBC with platelets differential    CT Chest Angio w and w/o contrast    Comprehensive metabolic panel    D-Dimer ([facility])    Troponin I    XR Chest 2 Views      Orders Needing Specimen Collection     None      Pending Results     Date and Time Order Name Status Description    10/15/2017 2100 CT Chest Angio w and w/o contrast In process     10/15/2017 2015 XR Chest  2 Views In process             Pending Culture Results     No orders found from 10/13/2017 to 10/16/2017.            Thank you for choosing [facility]       Thank you for choosing [facility] for your care. Our goal is always to provide you with excellent care. Hearing back from our patients is one way we can continue to improve our services. Please take a few minutes to complete the written survey that you may receive in the mail after you visit with us. Thank you!        [email or web address] Information     [employer] gives you secure access to your electronic health record. If you see a primary care provider, you can also send messages to your care team and make appointments. If you have questions, please call your primary care clinic.  If you do not have a primary care provider, please call [phone] and they will assist you.        Care EveryWhere ID     This is your Care EveryWhere ID. This could be used by other organizations to access your [facility] medical records  [id number]        Equal Access to Services     RADHA SCHNEIDER : Jessica Cline, demario hanson, marycruz dunbar, emir coffey . So [facility] [phone].    ATENCIÓN: Si habla español, tiene a thorpe disposición servicios gratuitos de asistencia lingüística. Llame al [phone].    We comply with applicable federal civil rights laws and [place] laws. We do not discriminate on the basis of race, color, national origin, age, disability, sex, sexual orientation, or gender identity.            After Visit Summary       This is your record. Keep this with you and show to your community pharmacist(s) and doctor(s) at your next visit.

## 2017-10-15 NOTE — ED AVS SNAPSHOT
[facility]    [address] [address]    Phone:  [phone]                                       Keira Qureshi   MRN: [id number]    Department:  [facility]   Date of Visit:  10/15/2017           After Visit Summary Signature Page     I have received my discharge instructions, and my questions have been answered. I have discussed any challenges I see with this plan with the nurse or doctor.    ..........................................................................................................................................  Patient/Patient Representative Signature      ..........................................................................................................................................  Patient Representative Print Name and Relationship to Patient    ..................................................               ................................................  Date                                            Time    ..........................................................................................................................................  Reviewed by Signature/Title    ...................................................              ..............................................  Date                                                            Time

## 2017-10-16 NOTE — ED NOTES
pt declined discharge vitals signs because her kid is crying in the car. Pt rates pain 3/10 in her chest. PVU discharge instructions.

## 2017-10-16 NOTE — ED PROVIDER NOTES
History     Chief Complaint   Patient presents with     Chest Pain     The history is provided by the patient.     Keira Qureshi is a 26 year old female who presented to the ED ambulatory for evaluation of chest pain.  The pain is described as dull and located substernal.  3/10 scale.  Started while watching TV.  Did have some radiation down her arms but this has resolved.  No shortness of breath.  Seems to worse with a deep breath.  No cough.  Past history of Behcets and takes dapsone.      I have reviewed the Medications, Allergies, Past Medical and Surgical History, and Social History in the Epic system.    Allergies:   Allergies   Allergen Reactions     Morphine      Headache and nausea     Tylenol W/Codeine [Acetaminophen-Codeine]          No current facility-administered medications on file prior to encounter.   Current Outpatient Prescriptions on File Prior to Encounter:  DULOXETINE HCL PO Take 20 mg by mouth 2 times daily   DAPSONE PO Take 100 mg by mouth daily   lidocaine, viscous, (XYLOCAINE) 2 % solution Take by mouth every 2 hours as needed for moderate pain To mouth sores as needed. The patient has 4% solution.   etonogestrel (IMPLANON/NEXPLANON) 68 MG IMPL 1 each (68 mg) by Subdermal route continuous       Patient Active Problem List   Diagnosis     Obesity     Behcet's disease (H)     Family planning     Encounter for routine gynecological examination     Oral aphthae     Papanicolaou smear of cervix with low grade squamous intraepithelial lesion (LGSIL)     Skin tag     Benign neoplasm of skin of trunk, except scrotum     ACP (advance care planning)     Chronic right shoulder pain     Strain of right rotator cuff capsule     Pregnancy test positive     Rh negative, maternal     MARTÍNEZ (generalized anxiety disorder)     Calcaneal spur, right     Pain in joint, ankle and foot, right     Adjustment disorder with mixed anxiety and depressed mood       Past Surgical History:   Procedure Laterality Date  "    EXCISE MASS BACK Left 3/7/2016    Procedure: EXCISE MASS BACK;  Surgeon: Safia Garza MD;  Location: [facility]       Social History   Substance Use Topics     Smoking status: Former Smoker     Packs/day: 1.00     Years: 10.00     Quit date: 7/1/2013     Smokeless tobacco: Never Used     Alcohol use No       Most Recent Immunizations   Administered Date(s) Administered     Influenza Vaccine IM 3yrs+ 4 Valent IIV4 01/22/2014     Rhogam 08/23/2016     TDAP Vaccine (Boostrix) 05/27/2014       BMI: Estimated body mass index is 31.01 kg/(m^2) as calculated from the following:    Height as of 4/19/17: 1.702 m (5' 7\").    Weight as of 4/19/17: 89.8 kg (198 lb).      Review of Systems   Constitutional: Negative for chills and fever.   Respiratory: Negative for chest tightness and shortness of breath.    Cardiovascular: Positive for chest pain. Negative for palpitations.   Gastrointestinal: Negative for abdominal pain, nausea and vomiting.   Genitourinary: Negative.    Musculoskeletal: Negative for back pain.   Skin: Negative.    Neurological: Negative for headaches.       Physical Exam   BP: 117/75  Pulse: 82  Heart Rate: 66  Temp: 98.2  F (36.8  C)  Resp: 14  SpO2: 96 %       Physical Exam   Constitutional: She is oriented to person, place, and time. She appears well-developed and well-nourished. No distress.   Cardiovascular: Normal rate and regular rhythm.    Pulmonary/Chest: Effort normal and breath sounds normal. No respiratory distress. She exhibits no tenderness.   Abdominal: Soft. There is no tenderness.   Neurological: She is alert and oriented to person, place, and time.   Skin: Skin is warm and dry.   Psychiatric: She has a normal mood and affect.   Nursing note and vitals reviewed.      ED Course     ED Course     Procedures          EKG shows a NSR without ST or T wave concerns.      CXR negative for PTX, focal infiltrate, or widened mediastinum.     Medications   ketorolac (TORADOL) injection 30 mg (30 mg " Intravenous Given 10/15/17 2031)   iopamidol (ISOVUE-370) solution 75 mL (75 mLs Intravenous Given 10/15/17 2152)   sodium chloride (PF) 0.9% PF flush 100 mL (100 mLs Intravenous Given 10/15/17 2152)     Results for orders placed or performed during the hospital encounter of 10/15/17 (from the past 24 hour(s))   CBC with platelets differential   Result Value Ref [facility]    WBC 8.6 4.0 - 11.0 10e9/L    RBC Count 3.18 (L) 3.8 - 5.2 10e12/L    Hemoglobin 11.0 (L) 11.7 - 15.7 g/dL    Hematocrit 33.3 (L) 35.0 - 47.0 %     (H) 78 - 100 fl    MCH 34.6 (H) 26.5 - 33.0 pg    MCHC 33.0 31.5 - 36.5 g/dL    RDW 11.9 10.0 - 15.0 %    Platelet Count 225 150 - 450 10e9/L    Diff Method Automated Method     % Neutrophils 65.6 %    % Lymphocytes 25.0 %    % Monocytes 7.9 %    % Eosinophils 0.9 %    % Basophils 0.2 %    % Immature Granulocytes 0.4 %    Nucleated RBCs 0 0 /100    Absolute Neutrophil 5.6 1.6 - 8.3 10e9/L    Absolute Lymphocytes 2.1 0.8 - 5.3 10e9/L    Absolute Monocytes 0.7 0.0 - 1.3 10e9/L    Absolute Eosinophils 0.1 0.0 - 0.7 10e9/L    Absolute Basophils 0.0 0.0 - 0.2 10e9/L    Abs Immature Granulocytes 0.0 0 - 0.4 10e9/L    Absolute Nucleated RBC 0.0    Comprehensive metabolic panel   Result Value Ref [facility]    Sodium 138 133 - 144 mmol/L    Potassium 3.8 3.4 - 5.3 mmol/L    Chloride 108 94 - 109 mmol/L    Carbon Dioxide 22 20 - 32 mmol/L    Anion Gap 8 3 - 14 mmol/L    Glucose 85 70 - 99 mg/dL    Urea Nitrogen 12 7 - 30 mg/dL    Creatinine 0.73 0.52 - 1.04 mg/dL    GFR Estimate >90 >60 mL/min/1.7m2    GFR Estimate If Black >90 >60 mL/min/1.7m2    Calcium 8.4 (L) 8.5 - 10.1 mg/dL    Bilirubin Total 0.5 0.2 - 1.3 mg/dL    Albumin 3.8 3.4 - 5.0 g/dL    Protein Total 7.7 6.8 - 8.8 g/dL    Alkaline Phosphatase 66 40 - 150 U/L    ALT 33 0 - 50 U/L    AST 26 0 - 45 U/L   Troponin I   Result Value Ref [facility]    Troponin I ES <0.015 0.000 - 0.045 ug/L   D-Dimer ([facility])   Result Value Ref [facility]    D-Dimer ng/mL 225 0 -  300 ng/ml D-DU       Critical Care time:  none               Labs Ordered and Resulted from Time of ED Arrival Up to the Time of Departure from the ED   CBC WITH PLATELETS DIFFERENTIAL - Abnormal; Notable for the following:        Result Value    RBC Count 3.18 (*)     Hemoglobin 11.0 (*)     Hematocrit 33.3 (*)      (*)     MCH 34.6 (*)     All other components within normal limits   COMPREHENSIVE METABOLIC PANEL - Abnormal; Notable for the following:     Calcium 8.4 (*)     All other components within normal limits   TROPONIN I   D-DIMER ([facility],)       Assessments & Plan (with Medical Decision Making)   Keira and I had a long and detailed discussion.  Moderate to high risk with her Behcet's.  Discussed results.  Discussed further work-up.  She feels that she needs further work-up.  Voiced understanding of risks. CTA negative.  I believe that emergent etiologies of her pain have been safely ruled out. Rest.  Follow-up in the clinic.  Return here for ANY other concerns or questions.     I have reviewed the nursing notes.    I have reviewed the findings, diagnosis, plan and need for follow up with the patient.       New Prescriptions    No medications on file       Final diagnoses:   Atypical chest pain       10/15/2017   [facility]     Radha Faust PA-C  10/15/17 [id number]

## 2017-10-16 NOTE — ED NOTES
"Pt reports to ER with report of shortness of breath, chest pain radiating down both arms.  Pt immediately to room 2, ekg completed, IV placed, labs drawn.  Pt placed on monitors. Denies any cardiac or respiratory history.  Pt reports \"sitting on couch\" when pain, shortness of breath started.    "

## 2017-10-16 NOTE — DISCHARGE INSTRUCTIONS
What to expect when you have contrast    During your exam, we will inject  contrast  into your vein or artery. (Contrast is a clear liquid with iodine in it. It shows up on X-rays.)    You may feel warm or hot. You may have a metal taste in your mouth and a slight upset stomach. You may also feel pressure near the kidneys and bladder. These effects will last about 1 to 3 minutes.    Please tell us if you have:    Sneezing     Itching    Hives     Swelling in the face    A hoarse voice    Breathing problems    Other new symptoms    Serious problems are rare.  They may include:    Irregular heartbeat     Seizures    Kidney failure              Tissue damage    Shock      Death    If you have any problems during the exam, we  will treat them right away.    When you get home    Call your hospital if you have any new symptoms in the next 2 days, like hives or swelling. (Phone numbers are at the bottom of this page.) Or call your family doctor.     If you have wheezing or trouble breathing, call 911.    Self-care  -Drink at least 4 extra glasses of water today.   This reduces the stress on your kidneys.  -Keep taking your regular medicines.    The contrast will pass out of your body in your  Urine(pee). This will happen in the next 24 hours. You  will not feel this. Your urine will not  change color.    If you have kidney problems or take metformin    Drink 4 to 8 large glasses of water for the next  2 days, if you are not on a fluid restriction.    ?If you take metformin (Glucophage or Glucovance) for diabetes, keep taking it.      ?Your kidney function tests are abnormal.  If you take Metformin, do not take it for 48 hours. Please go to your clinic for a blood test within 3 days after your exam before the restarting this medicine.     (Note to provider:please give patient prescription for lab tests.)    ?Special instructions: Drink at least 4 extra glasses of water today.   This reduces the stress on your kidneys.    I  have read and understand the above information.    Patient Sign Here:______________________________________Date:________Time:______    Staff Sign Here:________________________________________Date:_______Time:______      Radiology Departments:     ?[facility]: [phone] ?[place]: [phone]     ?[place]: [phone] ?[facility]:[phone]      ?[place]: [phone]  ?[place]: [phone]  ?[place]:[phone]    ?[facility]:[phone]  ?[facility]:[phone]    Your work-up did not show an emergent cause for your pain today.     Please follow-up in the clinic this week for recheck.     Please return HERE for ANY worsening or other concerns.

## 2017-12-05 ENCOUNTER — HOSPITAL ENCOUNTER (OUTPATIENT)
Dept: MRI IMAGING | Facility: HOSPITAL | Age: 27
Discharge: HOME | End: 2017-12-05
Attending: INTERNAL MEDICINE | Admitting: INTERNAL MEDICINE
Payer: COMMERCIAL

## 2017-12-05 DIAGNOSIS — M25.551 RIGHT HIP PAIN: ICD-10-CM

## 2017-12-05 PROCEDURE — 73721 MRI JNT OF LWR EXTRE W/O DYE: CPT | Mod: TC,RT

## 2017-12-13 ENCOUNTER — HOSPITAL ENCOUNTER (EMERGENCY)
Facility: HOSPITAL | Age: 27
Discharge: HOME | End: 2017-12-13
Attending: PHYSICIAN ASSISTANT | Admitting: PHYSICIAN ASSISTANT
Payer: COMMERCIAL

## 2017-12-13 VITALS
SYSTOLIC BLOOD PRESSURE: 107 MMHG | DIASTOLIC BLOOD PRESSURE: 62 MMHG | OXYGEN SATURATION: 98 % | HEIGHT: 67 IN | RESPIRATION RATE: 16 BRPM | TEMPERATURE: 99.2 F

## 2017-12-13 DIAGNOSIS — S00.551A FOREIGN BODY IN LIP, INITIAL ENCOUNTER: ICD-10-CM

## 2017-12-13 DIAGNOSIS — L03.211 CELLULITIS, FACE: ICD-10-CM

## 2017-12-13 PROCEDURE — 96372 THER/PROPH/DIAG INJ SC/IM: CPT

## 2017-12-13 PROCEDURE — 25000128 H RX IP 250 OP 636: Performed by: PHYSICIAN ASSISTANT

## 2017-12-13 PROCEDURE — 99214 OFFICE O/P EST MOD 30 MIN: CPT | Mod: 25

## 2017-12-13 PROCEDURE — 25000132 ZZH RX MED GY IP 250 OP 250 PS 637: Performed by: PHYSICIAN ASSISTANT

## 2017-12-13 PROCEDURE — 99214 OFFICE O/P EST MOD 30 MIN: CPT | Performed by: PHYSICIAN ASSISTANT

## 2017-12-13 RX ORDER — IBUPROFEN 800 MG/1
800 TABLET, FILM COATED ORAL ONCE
Status: COMPLETED | OUTPATIENT
Start: 2017-12-13 | End: 2017-12-13

## 2017-12-13 RX ORDER — CEFTRIAXONE SODIUM 1 G
1 VIAL (EA) INJECTION ONCE
Status: COMPLETED | OUTPATIENT
Start: 2017-12-13 | End: 2017-12-13

## 2017-12-13 RX ORDER — ACETAMINOPHEN 325 MG/1
975 TABLET ORAL ONCE
Status: COMPLETED | OUTPATIENT
Start: 2017-12-13 | End: 2017-12-13

## 2017-12-13 RX ADMIN — ACETAMINOPHEN 975 MG: 325 TABLET, FILM COATED ORAL at 12:56

## 2017-12-13 RX ADMIN — CEFTRIAXONE SODIUM 1 G: 1 INJECTION, POWDER, FOR SOLUTION INTRAMUSCULAR; INTRAVENOUS at 12:56

## 2017-12-13 RX ADMIN — IBUPROFEN 800 MG: 800 TABLET ORAL at 12:56

## 2017-12-13 ASSESSMENT — ENCOUNTER SYMPTOMS
PSYCHIATRIC NEGATIVE: 1
CARDIOVASCULAR NEGATIVE: 1
WOUND: 1
FACIAL SWELLING: 1
NEUROLOGICAL NEGATIVE: 1
FEVER: 1

## 2017-12-13 NOTE — ED AVS SNAPSHOT
[facility]    [address] [address]    Phone:  [phone]                                       Keira Qureshi   MRN: [id number]    Department:  [facility]   Date of Visit:  12/13/2017           After Visit Summary Signature Page     I have received my discharge instructions, and my questions have been answered. I have discussed any challenges I see with this plan with the nurse or doctor.    ..........................................................................................................................................  Patient/Patient Representative Signature      ..........................................................................................................................................  Patient Representative Print Name and Relationship to Patient    ..................................................               ................................................  Date                                            Time    ..........................................................................................................................................  Reviewed by Signature/Title    ...................................................              ..............................................  Date                                                            Time

## 2017-12-13 NOTE — ED AVS SNAPSHOT
[facility]    [address] [address]    Phone:  [phone]                                       Keira Qureshi   MRN: [id number]    Department:  [facility]   Date of Visit:  12/13/2017           Patient Information     Date Of Birth          1990        Your diagnoses for this visit were:     Cellulitis, face Tetanus 2014    Foreign body in lip, initial encounter Removed, intact       You were seen by Tenisha Singh PA.      Follow-up Information     Follow up with Margarette Puri MD.    Specialty:  Family Practice    Why:  If symptoms worsen or if not resolving in next 2 days.    Contact information:    [facility]  [address] [facility] [address]  [facility] [address] [phone]          Follow up with [facility].    Specialty:  EMERGENCY MEDICINE    Why:  If further concerns develop    Contact information:    [address] [place] [id number] [phone]    Additional information:    From [place] Area: Take [address]. Turn left at [address]. Turn left at the first stoplight on [address] [place] [address]. At the first stop sign, take a right onto [address]. Take a left into the parking lot and continue through until you reach the North enterance of the building.       From [place]: Take [address]. Take the [address] ramp towards [place]. Turn left onto [address]. Take a slight right onto [address]. Turn left at the first stoplight on [address]. At the first stop sign, take a right onto [address]. Take a left into the parking lot and continue through until you reach the North enterance of the building.       From [place]: Take [address]. Take a right at [address]. At the first stop sign, take a right onto [address]. Take a left into the parking lot and continue through until you reach the North enterance of the building.          Discharge Instructions       Apply ice to area 15min/hr as needed for pain and swelling.    Discharge References/Attachments     CELLULITIS, FACIAL (ENGLISH)    FOREIGN BODY, SOFT TISSUE (REMOVED) (ENGLISH)      Future Appointments        Provider Department Dept Phone Center    5/2/2018 1:30 PM Umm Han MD, MD [facility] [phone] [place]         Review of your medicines      START taking        Dose / Directions Last dose taken    amoxicillin-clavulanate 875-125 MG per tablet   Commonly known as:  AUGMENTIN   Dose:  1 tablet   Quantity:  20 tablet        Take 1 tablet by mouth 2 times daily   Refills:  0          Our records show that you are taking the medicines listed below. If these are incorrect, please call your family doctor or clinic.        Dose / Directions Last dose taken    etonogestrel 68 MG Impl   Commonly known as:  IMPLANON/NEXPLANON   Dose:  1 each        1 each (68 mg) by Subdermal route continuous   Refills:  0        IBUPROFEN PO   Dose:  800 mg        Take 800 mg by mouth   Refills:  0        lidocaine (viscous) 2 % solution   Commonly known as:  XYLOCAINE        Take by mouth every 2 hours as needed for moderate pain To mouth sores as needed. The patient has 4% solution.   Refills:  0                Prescriptions were sent or printed at these locations (1 Prescription)                   [facility] [address] [facility] [place] - [address] AT [facility] [address]   [address], [facility] [address]    Telephone:  [phone]   Fax:  [phone]   Hours:                  E-Prescribed (1 of 1)         amoxicillin-clavulanate (AUGMENTIN) 875-125 MG per tablet                Orders Needing Specimen Collection     None      Pending Results     No orders found from 12/11/2017 to 12/14/2017.            Pending Culture Results     No orders found from 12/11/2017 to 12/14/2017.            Thank you for choosing [facility]       Thank you for choosing [facility]  for your care. Our goal is always to provide you with excellent care. Hearing back from our patients is one way we can continue to improve our services. Please take a few minutes to complete the written survey that you may receive in the mail after you visit with us. Thank you!        Chromatinhart Information     My Visual Brief gives you secure access to your electronic health record. If you see a primary care provider, you can also send messages to your care team and make appointments. If you have questions, please call your primary care clinic.  If you do not have a primary care provider, please call [phone] and they will assist you.        Care EveryWhere ID     This is your Care EveryWhere ID. This could be used by other organizations to access your [facility] medical records  [id number]        Equal Access to Services     RADHA SCHNEIDER : Jessica Cline, demario hanson, marycruz dunbar, emir prado. So [facility] [phone].    ATENCIÓN: Si habla español, tiene a thorpe disposición servicios gratuitos de asistencia lingüística. Llame al [phone].    We comply with applicable federal civil rights laws and [place] laws. We do not discriminate on the basis of race, color, national origin, age, disability, sex, sexual orientation, or gender identity.            After Visit Summary       This is your record. Keep this with you and show to your community pharmacist(s) and doctor(s) at your next visit.

## 2017-12-13 NOTE — ED NOTES
Pt is here alone. She got her lip pierced on Monday and she states it is now infected. Unable to get the piercing out due to being swollen. Rates pain a 3. Ibuprofen 800mg about 0400 this morning.

## 2017-12-13 NOTE — ED PROVIDER NOTES
"  History     Chief Complaint   Patient presents with     Facial Swelling     Pt has an upper lip piercing. Stated she has had increased swelling of upper lip and drainage started today. \"I need to get the peircing out but I am unable to and need help\".     The history is provided by the patient. No  was used.     Keira Qureshi is a 27 year old female who has upper lip swelling/pain/drainage x 2 days. She had it pierced just prior to these s/s. Post still present, but the pt can not get it out. No n/v. Has low fever.  No change in b/b habits    Problem List:    Patient Active Problem List    Diagnosis Date Noted     Adjustment disorder with mixed anxiety and depressed mood 04/19/2017     Priority: Medium     Pain in joint, ankle and foot, right 01/11/2017     Priority: Medium     MARTÍNEZ (generalized anxiety disorder) 09/09/2016     Priority: Medium     Calcaneal spur, right 09/09/2016     Priority: Medium     Pregnancy test positive 08/23/2016     Priority: Medium     Rh negative, maternal 08/23/2016     Priority: Medium     ACP (advance care planning) 06/21/2016     Priority: Medium     Advance Care Planning 6/21/2016: ACP Review of Chart / Resources Provided:  Reviewed chart for advance care plan.  Keira Qureshi has no plan or code status on file. Discussed available resources and provided with information. Confirmed code status reflects current choices pending further ACP discussions.  Confirmed/documented legally designated decision makers.  Added by Alma Rosa Bethea             Chronic right shoulder pain 06/21/2016     Priority: Medium     Strain of right rotator cuff capsule 06/21/2016     Priority: Medium     Benign neoplasm of skin of trunk, except scrotum 02/11/2016     Priority: Medium     Skin tag 11/18/2015     Priority: Medium     Papanicolaou smear of cervix with low grade squamous intraepithelial lesion (LGSIL) 03/04/2015     Priority: Medium     Neg hr hpv 2015  ASCUS in 2016   " "    Encounter for routine gynecological examination 02/25/2015     Priority: Medium     Problem list name updated by automated process. Provider to review       Family planning 09/24/2014     Priority: Medium     nexplanon 8/29/16       Obesity 01/07/2014     Priority: Medium     Behcet's disease (H) 01/07/2014     Priority: Medium     O'Day       Oral aphthae 06/05/2008     Priority: Medium     Overview:   IMO Update 10/11          Past Medical History:    Past Medical History:   Diagnosis Date     Behcet's disease (H)        Past Surgical History:    Past Surgical History:   Procedure Laterality Date     EXCISE MASS BACK Left 3/7/2016    Procedure: EXCISE MASS BACK;  Surgeon: Safia Garza MD;  Location: [facility]       Family History:    Family History   Problem Relation Age of Onset     Hypertension Father      Nystagmus Son      Asthma Cousin      DIABETES No family hx of      Heart Failure Paternal Grandfather      HEART DISEASE Brother      SVT     Hypertension Brother        Social History:  Marital Status:  Single [1]  Social History   Substance Use Topics     Smoking status: Former Smoker     Packs/day: 1.00     Years: 10.00     Quit date: 7/1/2013     Smokeless tobacco: Never Used     Alcohol use No        Medications:      IBUPROFEN PO   amoxicillin-clavulanate (AUGMENTIN) 875-125 MG per tablet   lidocaine, viscous, (XYLOCAINE) 2 % solution   etonogestrel (IMPLANON/NEXPLANON) 68 MG IMPL         Review of Systems   Constitutional: Positive for fever.   HENT: Positive for facial swelling.    Cardiovascular: Negative.    Skin: Positive for wound.   Neurological: Negative.    Psychiatric/Behavioral: Negative.        Physical Exam   BP: 107/62  Heart Rate: 80  Temp: 99.2  F (37.3  C)  Resp: 16  Height: 170.2 cm (5' 7\")  SpO2: 98 %      Physical Exam   Constitutional: She is oriented to person, place, and time. She appears well-developed and well-nourished. No distress.   HENT:   Upper lip: moderate " edema/erythema and TTP. FB intact through the lip. The pt pushed the post towards the outside of her lip, so I could just get under the widened base, with wire cutters. Instead if snipping off the base of the post, the process pulled the whole post, intact,  through the hole.  FB completely removed. Pt tolerated well. Iodine applied to inside and outside hole in upper lip. Gauze with pressure applied. Bleeding resolved.   Cardiovascular: Normal rate.    Pulmonary/Chest: Effort normal.   Neurological: She is alert and oriented to person, place, and time.   Skin: She is not diaphoretic.   Psychiatric: She has a normal mood and affect.   Nursing note and vitals reviewed.      ED Course     ED Course     Procedures    Medications   cefTRIAXone (ROCEPHIN) injection 1 g (1 g Intramuscular Given 12/13/17 1256)   acetaminophen (TYLENOL) tablet 975 mg (975 mg Oral Given 12/13/17 1256)   ibuprofen (ADVIL/MOTRIN) tablet 800 mg (800 mg Oral Given 12/13/17 1256)   pt observed x 20 minutes, pt tolerated well    Assessments & Plan (with Medical Decision Making)     I have reviewed the nursing notes.    I have reviewed the findings, diagnosis, plan and need for follow up with the patient.      Discharge Medication List as of 12/13/2017  1:06 PM      START taking these medications    Details   amoxicillin-clavulanate (AUGMENTIN) 875-125 MG per tablet Take 1 tablet by mouth 2 times daily, Disp-20 tablet, R-0, E-Prescribe             Final diagnoses:   Cellulitis, face - Tetanus 2014   Foreign body in lip, initial encounter - Removed, intact         Keep area clean and dry.  Apply ice 15 min/hr as needed for swelling.   Patient verbally educated and given appropriate education sheets for the diagnoses and has no questions.  Take medications as directed.   Follow up with your Primary Care provider if symptoms increase or if not resolving in the next 2 days.\   if further concerns develop, return to the ER  Tenisha Singh  Certified  Physician Assistant  12/13/2017  3:56 PM  URGENT CARE CLINIC      12/13/2017   [facility]     Tenisha Singh PA  12/13/17 0200

## 2018-01-10 ENCOUNTER — OFFICE VISIT (OUTPATIENT)
Dept: CHIROPRACTIC MEDICINE | Facility: OTHER | Age: 28
End: 2018-01-10
Attending: CHIROPRACTOR
Payer: COMMERCIAL

## 2018-01-10 DIAGNOSIS — M99.03 SEGMENTAL AND SOMATIC DYSFUNCTION OF LUMBAR REGION: Primary | ICD-10-CM

## 2018-01-10 DIAGNOSIS — M99.02 SEGMENTAL AND SOMATIC DYSFUNCTION OF THORACIC REGION: ICD-10-CM

## 2018-01-10 DIAGNOSIS — M54.50 ACUTE LEFT-SIDED LOW BACK PAIN WITHOUT SCIATICA: ICD-10-CM

## 2018-01-10 PROCEDURE — 99202 OFFICE O/P NEW SF 15 MIN: CPT | Mod: 25 | Performed by: CHIROPRACTOR

## 2018-01-10 PROCEDURE — 98940 CHIROPRACT MANJ 1-2 REGIONS: CPT | Mod: AT | Performed by: CHIROPRACTOR

## 2018-01-10 NOTE — PROGRESS NOTES
Subjective Finding:    Chief compalint: Patient presents with:  Back Pain: left sided low back pain  , Pain Scale: 9/10, Intensity: sharp, Duration: 2 weeks, Radiating: left buttock.    Date of injury:     Activities that the pain restricts:   Home/household/hobbies/social activities: yes.  Work duties: yes.  Sleep: yes.  Makes symptoms better: rest.  Makes symptoms worse: activity.  Have you seen anyone else for the symptoms? no.  Work related: no.  Automobile related injury: no.    Objective and Assessment:    Posture Analysis:   High shoulder: .  Head tilt: .  High iliac crest: left.  Head carriage: neutral.  Thoracic Kyphosis: neutral.  Lumbar Lordosis: forward.    Lumbar Range of Motion: extension decreased and left lateral flexion decreased.  Cervical Range of Motion: .  Thoracic Range of Motion: .  Extremity Range of Motion: .    Palpation:   Quad lumb: left, referred pain: no    Segmental dysfunction pre-treatment and treatment area: T7, L5 and PSIS Left.    Assessment post-treatment:  Cervical: .  Thoracic: ROM increased.  Lumbar: ROM increased.    Comments: .      Complicating Factors: .    Procedure(s):  CMT:  68765 Chiropractic manipulative treatment 1-2 regions performed   Thoracic: Diversified, See above for level, Prone and Lumbar: Diversified, See above for level, Side posture    Modalities:  None performed this visit    Therapeutic procedures:  None    Plan:  Treatment plan: PRN.  Instructed patient: stretch as instructed at visit.  Short term goals: reduce pain.  Long term goals: restore normal function.  Prognosis: excellent.

## 2018-01-10 NOTE — MR AVS SNAPSHOT
After Visit Summary   1/10/2018    Keira Qureshi    MRN: [id number]           Patient Information     Date Of Birth          1990        Visit Information        Provider Department      1/10/2018 11:10 AM Everette Mcdonough DC [facility]        Today's Diagnoses     Segmental and somatic dysfunction of lumbar region    -  1    Acute left-sided low back pain without sciatica        Segmental and somatic dysfunction of thoracic region           Follow-ups after your visit        Your next 10 appointments already scheduled     Jan 12, 2018 11:00 AM CST   Return Visit with Everette Mcdonough DC   [facility] ([facility])    [address]  [facility] [address]   [phone]            May 02, 2018  1:30 PM CDT   (Arrive by 1:15 PM)   PHYSICAL with Umm Han MD   [facility] ([facility] )    [address] [facility] [address]   [phone]              Who to contact     If you have questions or need follow up information about today's clinic visit or your schedule please contact  [facility] [place] directly at [phone].  Normal or non-critical lab and imaging results will be communicated to you by RehabDevhart, letter or phone within 4 business days after the clinic has received the results. If you do not hear from us within 7 days, please contact the clinic through RehabDevhart or phone. If you have a critical or abnormal lab result, we will notify you by phone as soon as possible.  Submit refill requests through Commutable or call your pharmacy and they will forward the refill request to us. Please allow 3 business days for your refill to be completed.          Additional Information About Your Visit        RehabDevhart Information     Commutable gives you secure access to your electronic health record. If you see a primary care provider, you can also send messages to your care team and make appointments. If you have questions, please  call your primary care clinic.  If you do not have a primary care provider, please call [phone] and they will assist you.        Care EveryWhere ID     This is your Care EveryWhere ID. This could be used by other organizations to access your [facility] medical records  [id number]         Blood Pressure from Last 3 Encounters:   12/13/17 107/62   10/15/17 112/73   10/08/17 109/71    Weight from Last 3 Encounters:   04/19/17 198 lb (89.8 kg)   02/02/17 180 lb (81.6 kg)   01/11/17 185 lb (83.9 kg)              We Performed the Following     CHIROPRAC MANIP,SPINAL,1-2 REGIONS        Primary Care Provider Office Phone # Fax #    Margarette Puri [phone]       [facility] [address] [address]        Equal Access to Services     [facility] : Hadii aad ku hadasho Soomaali, waaxda luqadaha, qaybta kaalmada adeegyada, waxay edenin hayrusselln madonna coffey . So [facility] [phone].    ATENCIÓN: Si habla español, tiene a thorpe disposición servicios gratuitos de asistencia lingüística. Halle al [phone].    We comply with applicable federal civil rights laws and [place] laws. We do not discriminate on the basis of race, color, national origin, age, disability, sex, sexual orientation, or gender identity.            Thank you!     Thank you for choosing  [facility]  for your care. Our goal is always to provide you with excellent care. Hearing back from our patients is one way we can continue to improve our services. Please take a few minutes to complete the written survey that you may receive in the mail after your visit with us. Thank you!             Your Updated Medication List - Protect others around you: Learn how to safely use, store and throw away your medicines at www.disposemymeds.org.          This list is accurate as of: 1/10/18  2:25 PM.  Always use your most recent med list.                   Brand Name Dispense Instructions for use  Diagnosis    amoxicillin-clavulanate 875-125 MG per tablet    AUGMENTIN    20 tablet    Take 1 tablet by mouth 2 times daily        etonogestrel 68 MG Impl    IMPLANON/NEXPLANON     1 each (68 mg) by Subdermal route continuous    Family planning       IBUPROFEN PO      Take 800 mg by mouth        lidocaine (viscous) 2 % solution    XYLOCAINE     Take by mouth every 2 hours as needed for moderate pain To mouth sores as needed. The patient has 4% solution.

## 2018-01-12 ENCOUNTER — OFFICE VISIT (OUTPATIENT)
Dept: CHIROPRACTIC MEDICINE | Facility: OTHER | Age: 28
End: 2018-01-12
Attending: CHIROPRACTOR
Payer: COMMERCIAL

## 2018-01-12 DIAGNOSIS — M99.03 SEGMENTAL AND SOMATIC DYSFUNCTION OF LUMBAR REGION: Primary | ICD-10-CM

## 2018-01-12 DIAGNOSIS — M54.50 ACUTE BILATERAL LOW BACK PAIN WITHOUT SCIATICA: ICD-10-CM

## 2018-01-12 DIAGNOSIS — M99.01 SEGMENTAL AND SOMATIC DYSFUNCTION OF CERVICAL REGION: ICD-10-CM

## 2018-01-12 DIAGNOSIS — M99.02 SEGMENTAL AND SOMATIC DYSFUNCTION OF THORACIC REGION: ICD-10-CM

## 2018-01-12 PROCEDURE — 98941 CHIROPRACT MANJ 3-4 REGIONS: CPT | Mod: AT | Performed by: CHIROPRACTOR

## 2018-01-12 NOTE — MR AVS SNAPSHOT
After Visit Summary   1/12/2018    Keira Qureshi    MRN: [id number]           Patient Information     Date Of Birth          1990        Visit Information        Provider Department      1/12/2018 11:00 AM [facility]on[facility]        Today's Diagnoses     Segmental and somatic dysfunction of lumbar region    -  1    Acute bilateral low back pain without sciatica        Segmental and somatic dysfunction of thoracic region        Segmental and somatic dysfunction of cervical region           Follow-ups after your visit        Your next 10 appointments already scheduled     Jan 16, 2018 11:00 AM CST   Return Visit with Everette Mcdonough DC   [facility] [place] ([facility])    [address]  [facility] [address]   [phone]            May 02, 2018  1:30 PM CDT   (Arrive by 1:15 PM)   PHYSICAL with Umm Han MD   [facility] ([facility] )    [address] [facility] [address]   [phone]              Who to contact     If you have questions or need follow up information about today's clinic visit or your schedule please contact  [facility] [place] directly at [phone].  Normal or non-critical lab and imaging results will be communicated to you by Energenohart, letter or phone within 4 business days after the clinic has received the results. If you do not hear from us within 7 days, please contact the clinic through Energen[employer]art or phone. If you have a critical or abnormal lab result, we will notify you by phone as soon as possible.  Submit refill requests through TalentClick or call your pharmacy and they will forward the refill request to us. Please allow 3 business days for your refill to be completed.          Additional Information About Your Visit        Energenohart Information     TalentClick gives you secure access to your electronic health record. If you see a primary care provider, you can also send messages to your  care team and make appointments. If you have questions, please call your primary care clinic.  If you do not have a primary care provider, please call [phone] and they will assist you.        Care EveryWhere ID     This is your Care EveryWhere ID. This could be used by other organizations to access your [facility] medical records  [id number]         Blood Pressure from Last 3 Encounters:   12/13/17 107/62   10/15/17 112/73   10/08/17 109/71    Weight from Last 3 Encounters:   04/19/17 198 lb (89.8 kg)   02/02/17 180 lb (81.6 kg)   01/11/17 185 lb (83.9 kg)              We Performed the Following     CHIROPRAC MANIP,SPINAL,3-4 REGIONS        Primary Care Provider Office Phone # Fax #    Margarette Puri [phone]       [facility] [address] [address]        Equal Access to Services     RADHA SCHNEIDER : Hadii aad ku hadasho Soomaali, waaxda luqadaha, qaybta kaalmada adeegyada, waxay edenin hayrusselln madonna coffey . So [facility] [phone].    ATENCIÓN: Si aric chirinos, tiene a thorpe disposición servicios gratuitos de asistencia lingüística. Halle al [phone].    We comply with applicable federal civil rights laws and [place] laws. We do not discriminate on the basis of race, color, national origin, age, disability, sex, sexual orientation, or gender identity.            Thank you!     Thank you for choosing  [facility]  for your care. Our goal is always to provide you with excellent care. Hearing back from our patients is one way we can continue to improve our services. Please take a few minutes to complete the written survey that you may receive in the mail after your visit with us. Thank you!             Your Updated Medication List - Protect others around you: Learn how to safely use, store and throw away your medicines at www.disposemymeds.org.          This list is accurate as of: 1/12/18 11:59 PM.  Always use your most recent med list.                    Brand Name Dispense Instructions for use Diagnosis    amoxicillin-clavulanate 875-125 MG per tablet    AUGMENTIN    20 tablet    Take 1 tablet by mouth 2 times daily        etonogestrel 68 MG Impl    IMPLANON/NEXPLANON     1 each (68 mg) by Subdermal route continuous    Family planning       IBUPROFEN PO      Take 800 mg by mouth        lidocaine (viscous) 2 % solution    XYLOCAINE     Take by mouth every 2 hours as needed for moderate pain To mouth sores as needed. The patient has 4% solution.

## 2018-01-15 NOTE — PROGRESS NOTES
Subjective Finding:    Chief compalint: Patient presents with:  Back Pain: follow up visit.  much better  Neck Pain  , Pain Scale: 9/10, Intensity: sharp, Duration: 2 weeks, Radiating: left buttock.    Date of injury:     Activities that the pain restricts:   Home/household/hobbies/social activities: yes.  Work duties: yes.  Sleep: yes.  Makes symptoms better: rest.  Makes symptoms worse: activity.  Have you seen anyone else for the symptoms? no.  Work related: no.  Automobile related injury: no.    Objective and Assessment:    Posture Analysis:   High shoulder: .  Head tilt: .  High iliac crest: left.  Head carriage: neutral.  Thoracic Kyphosis: neutral.  Lumbar Lordosis: forward.    Lumbar Range of Motion: extension decreased and left lateral flexion decreased.  Cervical Range of Motion: .  Thoracic Range of Motion: .  Extremity Range of Motion: .    Palpation:   Quad lumb: left, referred pain: no    Segmental dysfunction pre-treatment and treatment area: T7, L5 and PSIS Left.  C7    Assessment post-treatment:  Cervical: .  Thoracic: ROM increased.  Lumbar: ROM increased.    Comments: .      Complicating Factors: .    Procedure(s):  [facility]:  [address]78 Chiropractic manipulative treatment 1-2 regions performed   Thoracic: Diversified, See above for level, Prone and Lumbar: Diversified, See above for level, Side posture    Modalities:  None performed this visit    Therapeutic procedures:  None    Plan:  Treatment plan: PRN.  Instructed patient: stretch as instructed at visit.  Short term goals: reduce pain.  Long term goals: restore normal function.  Prognosis: excellent.

## 2018-01-16 ENCOUNTER — OFFICE VISIT (OUTPATIENT)
Dept: CHIROPRACTIC MEDICINE | Facility: OTHER | Age: 28
End: 2018-01-16
Attending: CHIROPRACTOR
Payer: COMMERCIAL

## 2018-01-16 DIAGNOSIS — M99.03 SEGMENTAL AND SOMATIC DYSFUNCTION OF LUMBAR REGION: Primary | ICD-10-CM

## 2018-01-16 DIAGNOSIS — M54.50 ACUTE BILATERAL LOW BACK PAIN WITHOUT SCIATICA: ICD-10-CM

## 2018-01-16 DIAGNOSIS — M99.02 SEGMENTAL AND SOMATIC DYSFUNCTION OF THORACIC REGION: ICD-10-CM

## 2018-01-16 PROCEDURE — 98940 CHIROPRACT MANJ 1-2 REGIONS: CPT | Mod: AT | Performed by: CHIROPRACTOR

## 2018-01-16 NOTE — MR AVS SNAPSHOT
After Visit Summary   1/16/2018    Keira Qureshi    MRN: [id number]           Patient Information     Date Of Birth          1990        Visit Information        Provider Department      1/16/2018 11:00 AM [facility]        Today's Diagnoses     Segmental and somatic dysfunction of lumbar region    -  1    Acute bilateral low back pain without sciatica        Segmental and somatic dysfunction of thoracic region           Follow-ups after your visit        Your next 10 appointments already scheduled     May 02, 2018  1:30 PM CDT   (Arrive by 1:15 PM)   PHYSICAL with Umm Han MD   [facility] [place] ([facility] )    [address]  [place] [address]   [phone]              Who to contact     If you have questions or need follow up information about today's clinic visit or your schedule please contact  [facility] directly at [phone].  Normal or non-critical lab and imaging results will be communicated to you by MyChart, letter or phone within 4 business days after the clinic has received the results. If you do not hear from us within 7 days, please contact the clinic through [employer]hart or phone. If you have a critical or abnormal lab result, we will notify you by phone as soon as possible.  Submit refill requests through Let or call your pharmacy and they will forward the refill request to us. Please allow 3 business days for your refill to be completed.          Additional Information About Your Visit        MyChart Information     Let gives you secure access to your electronic health record. If you see a primary care provider, you can also send messages to your care team and make appointments. If you have questions, please call your primary care clinic.  If you do not have a primary care provider, please call [phone] and they will assist you.        Care EveryWhere ID     This is your Care  EveryWhere ID. This could be used by other organizations to access your [facility] medical records  [id number]         Blood Pressure from Last 3 Encounters:   12/13/17 107/62   10/15/17 112/73   10/08/17 109/71    Weight from Last 3 Encounters:   04/19/17 198 lb (89.8 kg)   02/02/17 180 lb (81.6 kg)   01/11/17 185 lb (83.9 kg)              We Performed the Following     CHIROPRAC MANIP,SPINAL,1-2 REGIONS        Primary Care Provider Office Phone # Fax #    Margarette ANDERSON MD Khushi [phone]       [facility] [address] [address]        Equal Access to Services     RADHA SCHNEIDER : Hadii cecilia locoo Sopancho, waaxda luqadaha, qaybta kaalmada adeegyada, emir coffey . So [facility] [phone].    ATENCIÓN: Si habla español, tiene a thorpe disposición servicios gratuitos de asistencia lingüística. [facility] [phone].    We comply with applicable federal civil rights laws and [place] laws. We do not discriminate on the basis of race, color, national origin, age, disability, sex, sexual orientation, or gender identity.            Thank you!     Thank you for choosing  [facility]  for your care. Our goal is always to provide you with excellent care. Hearing back from our patients is one way we can continue to improve our services. Please take a few minutes to complete the written survey that you may receive in the mail after your visit with us. Thank you!             Your Updated Medication List - Protect others around you: Learn how to safely use, store and throw away your medicines at www.disposemymeds.org.          This list is accurate as of: 1/16/18 11:59 PM.  Always use your most recent med list.                   Brand Name Dispense Instructions for use Diagnosis    amoxicillin-clavulanate 875-125 MG per tablet    AUGMENTIN    20 tablet    Take 1 tablet by mouth 2 times daily        etonogestrel 68 MG Impl    IMPLANON/NEXPLANON      1 each (68 mg) by Subdermal route continuous    Family planning       IBUPROFEN PO      Take 800 mg by mouth        lidocaine (viscous) 2 % solution    XYLOCAINE     Take by mouth every 2 hours as needed for moderate pain To mouth sores as needed. The patient has 4% solution.

## 2018-01-17 NOTE — PROGRESS NOTES
Subjective Finding:    Chief compalint: Patient presents with:  Back Pain: good relief so far with treatment  , Pain Scale: 9/10, Intensity: sharp, Duration: 2 weeks, Radiating: left buttock.    Date of injury:     Activities that the pain restricts:   Home/household/hobbies/social activities: yes.  Work duties: yes.  Sleep: yes.  Makes symptoms better: rest.  Makes symptoms worse: activity.  Have you seen anyone else for the symptoms? no.  Work related: no.  Automobile related injury: no.    Objective and Assessment:    Posture Analysis:   High shoulder: .  Head tilt: .  High iliac crest: left.  Head carriage: neutral.  Thoracic Kyphosis: neutral.  Lumbar Lordosis: forward.    Lumbar Range of Motion: extension decreased and left lateral flexion decreased.  Cervical Range of Motion: .  Thoracic Range of Motion: .  Extremity Range of Motion: .    Palpation:   Quad lumb: left, referred pain: no    Segmental dysfunction pre-treatment and treatment area: T7, L5 and PSIS Left.      Assessment post-treatment:  Cervical: .  Thoracic: ROM increased.  Lumbar: ROM increased.    Comments: .      Complicating Factors: .    Procedure(s):  CMT:  99713 Chiropractic manipulative treatment 1-2 regions performed   Thoracic: Diversified, See above for level, Prone and Lumbar: Diversified, See above for level, Side posture    Modalities:  None performed this visit    Therapeutic procedures:  None    Plan:  Treatment plan: PRN.  Instructed patient: stretch as instructed at visit.  Short term goals: reduce pain.  Long term goals: restore normal function.  Prognosis: excellent.

## 2018-01-27 ENCOUNTER — HOSPITAL ENCOUNTER (EMERGENCY)
Facility: HOSPITAL | Age: 28
Discharge: HOME | End: 2018-01-27
Attending: NURSE PRACTITIONER | Admitting: NURSE PRACTITIONER
Payer: COMMERCIAL

## 2018-01-27 ENCOUNTER — APPOINTMENT (OUTPATIENT)
Dept: GENERAL RADIOLOGY | Facility: HOSPITAL | Age: 28
End: 2018-01-27
Attending: FAMILY MEDICINE
Payer: COMMERCIAL

## 2018-01-27 VITALS
RESPIRATION RATE: 16 BRPM | SYSTOLIC BLOOD PRESSURE: 127 MMHG | HEART RATE: 103 BPM | DIASTOLIC BLOOD PRESSURE: 69 MMHG | TEMPERATURE: 99.3 F | OXYGEN SATURATION: 98 %

## 2018-01-27 DIAGNOSIS — R07.0 THROAT PAIN: ICD-10-CM

## 2018-01-27 DIAGNOSIS — M72.2 PLANTAR FASCIITIS: ICD-10-CM

## 2018-01-27 LAB
DEPRECATED S PYO AG THROAT QL EIA: NORMAL
FLUAV+FLUBV AG SPEC QL: NEGATIVE
FLUAV+FLUBV AG SPEC QL: NEGATIVE
SPECIMEN SOURCE: NORMAL
SPECIMEN SOURCE: NORMAL

## 2018-01-27 PROCEDURE — 87880 STREP A ASSAY W/OPTIC: CPT | Performed by: FAMILY MEDICINE

## 2018-01-27 PROCEDURE — 96372 THER/PROPH/DIAG INJ SC/IM: CPT

## 2018-01-27 PROCEDURE — G0463 HOSPITAL OUTPT CLINIC VISIT: HCPCS | Mod: 25

## 2018-01-27 PROCEDURE — 99214 OFFICE O/P EST MOD 30 MIN: CPT | Performed by: NURSE PRACTITIONER

## 2018-01-27 PROCEDURE — 87081 CULTURE SCREEN ONLY: CPT | Performed by: FAMILY MEDICINE

## 2018-01-27 PROCEDURE — 25000128 H RX IP 250 OP 636: Performed by: NURSE PRACTITIONER

## 2018-01-27 PROCEDURE — 73630 X-RAY EXAM OF FOOT: CPT | Mod: TC,LT

## 2018-01-27 PROCEDURE — 87804 INFLUENZA ASSAY W/OPTIC: CPT | Performed by: FAMILY MEDICINE

## 2018-01-27 RX ORDER — KETOROLAC TROMETHAMINE 30 MG/ML
60 INJECTION, SOLUTION INTRAMUSCULAR; INTRAVENOUS ONCE
Status: COMPLETED | OUTPATIENT
Start: 2018-01-27 | End: 2018-01-27

## 2018-01-27 RX ORDER — PREDNISONE 20 MG/1
TABLET ORAL
Qty: 10 TABLET | Refills: 0 | Status: SHIPPED | OUTPATIENT
Start: 2018-01-27 | End: 2018-06-18

## 2018-01-27 RX ORDER — TRAMADOL HYDROCHLORIDE 50 MG/1
1 TABLET, FILM COATED ORAL ONCE
Status: DISCONTINUED | OUTPATIENT
Start: 2018-01-27 | End: 2018-01-28 | Stop reason: HOSPADM

## 2018-01-27 RX ADMIN — KETOROLAC TROMETHAMINE 60 MG: 30 INJECTION, SOLUTION INTRAMUSCULAR at 21:47

## 2018-01-27 ASSESSMENT — ENCOUNTER SYMPTOMS
FATIGUE: 1
APPETITE CHANGE: 0
VOMITING: 0
COUGH: 0
RHINORRHEA: 1
ABDOMINAL PAIN: 0
SORE THROAT: 1
CHILLS: 0
FEVER: 1
DIARRHEA: 0
NAUSEA: 0

## 2018-01-27 NOTE — ED AVS SNAPSHOT
[facility]    [address] [address]    Phone:  [phone]                                       Keira Qureshi   MRN: [id number]    Department:  [facility]   Date of Visit:  1/27/2018           Patient Information     Date Of Birth          1990        Your diagnoses for this visit were:     Plantar fasciitis left foot    Throat pain        You were seen by Magaly Light NP.      Follow-up Information     Follow up with [facility].    Specialty:  EMERGENCY MEDICINE    Why:  As needed, If symptoms worsen, or concerns develop    Contact information:    [address] [address] [phone]    Additional information:    From [facility] ([place], [place]): Take [address]. Turn left at [address]. Turn left at the first stoplight on [address]. At the first stop sign, take a right onto [address]. Take a left into the parking lot and continue through until you reach the North enterance of the building.       From [place]: Take [address]. Take the [address] ramp towards [place]. Turn left onto [address]. Take a slight right onto [address]. Turn left at the first stoplight on [address]. At the first stop sign, take a right onto [address]. Take a left into the parking lot and continue through until you reach the North enterance of the building.       From [place]: Take [address]. Take a right at [address]. At the first stop sign, take a right onto [address]. Take a left into the parking lot and continue through until you reach the North enterance of the building.         Follow up with Margarette Puri MD.    Specialty:  Family Practice    Why:  As needed, if symptoms do not improve    Contact information:    [facility]  [address]   Critical access hospital [address]  [phone]        Discharge References/Attachments      PLANTAR FASCIITIS, TREATING (ENGLISH)      Future Appointments        Provider Department Dept Phone Center    5/2/2018 1:30 PM Umm Han MD, MD [facility] [phone] [facility]         Review of your medicines      START taking        Dose / Directions Last dose taken    predniSONE 20 MG tablet   Commonly known as:  DELTASONE   Quantity:  10 tablet        Take two tablets (= 40mg) each day for 5 (five) days   Refills:  0          Our records show that you are taking the medicines listed below. If these are incorrect, please call your family doctor or clinic.        Dose / Directions Last dose taken    etonogestrel 68 MG Impl   Commonly known as:  IMPLANON/NEXPLANON   Dose:  1 each        1 each (68 mg) by Subdermal route continuous   Refills:  0        IBUPROFEN PO   Dose:  800 mg        Take 800 mg by mouth   Refills:  0        lidocaine (viscous) 2 % solution   Commonly known as:  XYLOCAINE        Take by mouth every 2 hours as needed for moderate pain To mouth sores as needed. The patient has 4% solution.   Refills:  0                Prescriptions were sent or printed at these locations (1 Prescription)                   [facility] [place] [facility] [address] AT [facility] [address]   [address], [place] [address]    Telephone:  [phone]   Fax:  [phone]   Hours:                  E-Prescribed (1 of 1)         predniSONE (DELTASONE) 20 MG tablet                Procedures and tests performed during your visit     Beta strep group A culture    Influenza A/B antigen    Rapid strep screen    XR Foot Left G/E 3 Views      Orders Needing Specimen Collection     None      Pending Results     Date and Time Order Name Status Description    1/27/2018 2033 Beta strep group A culture In process             Pending Culture Results     Date and Time Order Name Status Description    1/27/2018 2033 Beta strep group A culture In process             Thank you for  choosing [facility]       Thank you for choosing [facility] for your care. Our goal is always to provide you with excellent care. Hearing back from our patients is one way we can continue to improve our services. Please take a few minutes to complete the written survey that you may receive in the mail after you visit with us. Thank you!        HengZhihart Information     [employer] gives you secure access to your electronic health record. If you see a primary care provider, you can also send messages to your care team and make appointments. If you have questions, please call your primary care clinic.  If you do not have a primary care provider, please call [phone] and they will assist you.        Care EveryWhere ID     This is your Care EveryWhere ID. This could be used by other organizations to access your [facility] medical records  [id number]        Equal Access to Services     RADHA SCHNEIDER : Jessica Cline, demario hanson, marycruz dunbar, emir prado. So [facility] [phone].    ATENCIÓN: Si habla español, tiene a thorpe disposición servicios gratuitos de asistencia lingüística. Llame al [phone].    We comply with applicable federal civil rights laws and [place] laws. We do not discriminate on the basis of race, color, national origin, age, disability, sex, sexual orientation, or gender identity.            After Visit Summary       This is your record. Keep this with you and show to your community pharmacist(s) and doctor(s) at your next visit.

## 2018-01-27 NOTE — ED AVS SNAPSHOT
[facility]    [address] [address]    Phone:  [phone]                                       Keira Qureshi   MRN: [id number]    Department:  [facility]   Date of Visit:  1/27/2018           After Visit Summary Signature Page     I have received my discharge instructions, and my questions have been answered. I have discussed any challenges I see with this plan with the nurse or doctor.    ..........................................................................................................................................  Patient/Patient Representative Signature      ..........................................................................................................................................  Patient Representative Print Name and Relationship to Patient    ..................................................               ................................................  Date                                            Time    ..........................................................................................................................................  Reviewed by Signature/Title    ...................................................              ..............................................  Date                                                            Time

## 2018-01-28 ASSESSMENT — ENCOUNTER SYMPTOMS
ARTHRALGIAS: 1
WOUND: 0
MYALGIAS: 1

## 2018-01-28 NOTE — ED PROVIDER NOTES
History     Chief Complaint   Patient presents with     Foot Pain     left foot pain. pt has plantar faciitis      Fatigue     Pharyngitis     The history is provided by the patient. No  was used.     Keira Qureshi is a 27 year old female who presents today with a CC of left foot pain x several weeks.  She notes she has a history of plantar fasciitis, has taken meloxicam in the past with improvement.  Has not taken anything for pain today.  She denies injury or trauma.      Sore throat:  For the past few days.  She was exposed to her son who was exposed to a child at  who tested positive for strep.  Low grade fever today,  Has not taken anything for pain.  Eating and drinking ok.      Problem List:    Patient Active Problem List    Diagnosis Date Noted     Adjustment disorder with mixed anxiety and depressed mood 04/19/2017     Priority: Medium     Pain in joint, ankle and foot, right 01/11/2017     Priority: Medium     MARTÍNEZ (generalized anxiety disorder) 09/09/2016     Priority: Medium     Calcaneal spur, right 09/09/2016     Priority: Medium     Pregnancy test positive 08/23/2016     Priority: Medium     Rh negative, maternal 08/23/2016     Priority: Medium     ACP (advance care planning) 06/21/2016     Priority: Medium     Advance Care Planning 6/21/2016: ACP Review of Chart / Resources Provided:  Reviewed chart for advance care plan.  Keira Qureshi has no plan or code status on file. Discussed available resources and provided with information. Confirmed code status reflects current choices pending further ACP discussions.  Confirmed/documented legally designated decision makers.  Added by Amla Rosa Bethea             Chronic right shoulder pain 06/21/2016     Priority: Medium     Strain of right rotator cuff capsule 06/21/2016     Priority: Medium     Benign neoplasm of skin of trunk, except scrotum 02/11/2016     Priority: Medium     Skin tag 11/18/2015     Priority: Medium      Papanicolaou smear of cervix with low grade squamous intraepithelial lesion (LGSIL) 03/04/2015     Priority: Medium     Neg hr hpv 2015  ASCUS in 2016       Encounter for routine gynecological examination 02/25/2015     Priority: Medium     Problem list name updated by automated process. Provider to review       Family planning 09/24/2014     Priority: Medium     nexplanon 8/29/16       Obesity 01/07/2014     Priority: Medium     Behcet's disease (H) 01/07/2014     Priority: Medium     O'Day       Oral aphthae 06/05/2008     Priority: Medium     Overview:   IMO Update 10/11          Past Medical History:    Past Medical History:   Diagnosis Date     Behcet's disease (H)        Past Surgical History:    Past Surgical History:   Procedure Laterality Date     EXCISE MASS BACK Left 3/7/2016    Procedure: EXCISE MASS BACK;  Surgeon: Safia Garza MD;  Location: [facility]       Family History:    Family History   Problem Relation Age of Onset     Hypertension Father      Nystagmus Son      Asthma Cousin      DIABETES No family hx of      Heart Failure Paternal Grandfather      HEART DISEASE Brother      SVT     Hypertension Brother        Social History:  Marital Status:  Single [1]  Social History   Substance Use Topics     Smoking status: Former Smoker     Packs/day: 1.00     Years: 10.00     Quit date: 7/1/2013     Smokeless tobacco: Never Used     Alcohol use No        Medications:      predniSONE (DELTASONE) 20 MG tablet   etonogestrel (IMPLANON/NEXPLANON) 68 MG IMPL   IBUPROFEN PO   lidocaine, viscous, (XYLOCAINE) 2 % solution         Review of Systems   Constitutional: Positive for fatigue and fever. Negative for appetite change and chills.   HENT: Positive for congestion, rhinorrhea and sore throat. Negative for ear pain.    Respiratory: Negative for cough.    Gastrointestinal: Negative for abdominal pain, diarrhea, nausea and vomiting.   Musculoskeletal: Positive for arthralgias and myalgias.   Skin: Negative for  wound.       Physical Exam   BP: 127/69  Pulse: 103  Temp: 99.3  F (37.4  C)  Resp: 16  SpO2: 98 %      Physical Exam   Constitutional: She is oriented to person, place, and time. She appears well-developed.   HENT:   Head: Normocephalic and atraumatic.   Mouth/Throat: Uvula is midline and oropharynx is clear and moist.   Cardiovascular: Normal rate.    Pulmonary/Chest: Effort normal.   Musculoskeletal:        Left foot: There is tenderness (plantar surface, arch), bony tenderness (dorsal surface, heel) and swelling (mild, plantar surface, arch). There is normal capillary refill (foot is normothermic, skin intact, pedal pulse 2+, sensation grossly intact), no crepitus and no laceration.   Neurological: She is alert and oriented to person, place, and time.   Psychiatric:   tearful   Nursing note and vitals reviewed.      ED Course     ED Course     Procedures     Results for orders placed or performed during the hospital encounter of 01/27/18   XR Foot Left G/E 3 Views    Narrative    PROCEDURE:  XR FOOT LT G/E 3 VW    HISTORY: Trauma;     COMPARISON:  None.    TECHNIQUE:  3 views of the left foot were obtained.    FINDINGS:  No fracture or dislocation is identified. The joint spaces  are preserved. There Is a small spur at the insertion of the plantar  fascia into the calcaneus       Impression    IMPRESSION: No acute fracture.      ALBERT DAVIS MD   Rapid strep screen   Result Value Ref Range    Specimen Description Throat     Rapid Strep A Screen       NEGATIVE: No Group A streptococcal antigen detected by immunoassay, await culture report.   Influenza A/B antigen   Result Value Ref Range    Influenza A/B Agn Specimen Nasopharyngeal     Influenza A Negative NEG^Negative    Influenza B Negative NEG^Negative   Beta strep group A culture   Result Value Ref Range    Specimen Description Throat     Culture Micro Culture in progress      Taped left foot for plantar fasciitis with mild improvement    Medications  "  ketorolac (TORADOL) injection 60 mg (60 mg Intramuscular Given 1/27/18 1286)     Observed for a minimum of 20 minutes, tolerated medications well, no adverse efects noted, reports pain is mildly improved on discharge.  Patient is walking with less of a limp on left foot    Assessments & Plan (with Medical Decision Making)     I have reviewed the nursing notes.    I have reviewed the findings, diagnosis, plan and need for follow up with the patient.  ASSESSMENT / PLAN:  (M72.2) Plantar fasciitis  Comment: left foot, acute on chronic, has followed with Podiatry in the past with injections, currently in between podiatrists  Plan:  Ice, elevate as much as possible   Tape as discussed for plantar fasciitis   Prednisone as prescribed - discussed risks and benefits of prednisone, patient states she has been on it several times in the past, tolerates well   Schedule appointment with podiatry for follow up    (R07.0) Throat pain  Comment: rapid strep negative  Plan:  The rapid strep was negative, the strep culture is pending, we will call you with positive results only and treat with antibiotics as needed   Warm salt water gargles several times per day   Ibuprofen and tylenol per package directions for pain/fever   Cepacol lozenges OTC per package directions   Increase fluids, wash hands frequently, rest   Patient verbally educated and given appropriate education sheets for their diagnoses and has no questions.   Return to ED/UC if symptoms increase or concerns develop, red flag symptoms as discussed and per discharge instructions, increasing throat pain, trouble swallowing,  \"hot potato voice\", drooling, shortness of breath/airway compromise   Follow up with your Primary Care provider if symptoms do not improve in 2-3 days      Discharge Medication List as of 1/27/2018 10:09 PM      START taking these medications    Details   predniSONE (DELTASONE) 20 MG tablet Take two tablets (= 40mg) each day for 5 (five) days, Disp-10 " tablet, R-0, E-Prescribe             Final diagnoses:   Plantar fasciitis - left foot   Throat pain       1/27/2018   [facility]     Magaly Light NP  01/28/18 9507

## 2018-01-29 LAB
BACTERIA SPEC CULT: NORMAL
SPECIMEN SOURCE: NORMAL

## 2018-03-09 ENCOUNTER — APPOINTMENT (OUTPATIENT)
Dept: OCCUPATIONAL MEDICINE | Facility: OTHER | Age: 28
End: 2018-03-09

## 2018-03-14 ENCOUNTER — APPOINTMENT (OUTPATIENT)
Dept: OCCUPATIONAL MEDICINE | Facility: OTHER | Age: 28
End: 2018-03-14

## 2018-05-02 ENCOUNTER — OFFICE VISIT (OUTPATIENT)
Dept: OBGYN | Facility: OTHER | Age: 28
End: 2018-05-02
Attending: OBSTETRICS & GYNECOLOGY
Payer: COMMERCIAL

## 2018-05-02 VITALS
SYSTOLIC BLOOD PRESSURE: 100 MMHG | BODY MASS INDEX: 30.61 KG/M2 | WEIGHT: 195 LBS | DIASTOLIC BLOOD PRESSURE: 68 MMHG | HEART RATE: 60 BPM | HEIGHT: 67 IN

## 2018-05-02 DIAGNOSIS — Z00.00 ROUTINE GENERAL MEDICAL EXAMINATION AT A HEALTH CARE FACILITY: Primary | ICD-10-CM

## 2018-05-02 LAB
C TRACH DNA SPEC QL PROBE+SIG AMP: NOT DETECTED
N GONORRHOEA DNA SPEC QL PROBE+SIG AMP: NOT DETECTED
SPECIMEN SOURCE: NORMAL

## 2018-05-02 PROCEDURE — 87491 CHLMYD TRACH DNA AMP PROBE: CPT | Mod: ZL | Performed by: OBSTETRICS & GYNECOLOGY

## 2018-05-02 PROCEDURE — 87591 N.GONORRHOEAE DNA AMP PROB: CPT | Mod: ZL | Performed by: OBSTETRICS & GYNECOLOGY

## 2018-05-02 PROCEDURE — G0123 SCREEN CERV/VAG THIN LAYER: HCPCS | Mod: ZL | Performed by: OBSTETRICS & GYNECOLOGY

## 2018-05-02 PROCEDURE — 11982 REMOVE DRUG IMPLANT DEVICE: CPT | Performed by: OBSTETRICS & GYNECOLOGY

## 2018-05-02 PROCEDURE — 99395 PREV VISIT EST AGE 18-39: CPT | Mod: 25 | Performed by: OBSTETRICS & GYNECOLOGY

## 2018-05-02 ASSESSMENT — ANXIETY QUESTIONNAIRES
2. NOT BEING ABLE TO STOP OR CONTROL WORRYING: SEVERAL DAYS
IF YOU CHECKED OFF ANY PROBLEMS ON THIS QUESTIONNAIRE, HOW DIFFICULT HAVE THESE PROBLEMS MADE IT FOR YOU TO DO YOUR WORK, TAKE CARE OF THINGS AT HOME, OR GET ALONG WITH OTHER PEOPLE: SOMEWHAT DIFFICULT
5. BEING SO RESTLESS THAT IT IS HARD TO SIT STILL: SEVERAL DAYS
6. BECOMING EASILY ANNOYED OR IRRITABLE: MORE THAN HALF THE DAYS
3. WORRYING TOO MUCH ABOUT DIFFERENT THINGS: MORE THAN HALF THE DAYS
GAD7 TOTAL SCORE: 10
1. FEELING NERVOUS, ANXIOUS, OR ON EDGE: SEVERAL DAYS
7. FEELING AFRAID AS IF SOMETHING AWFUL MIGHT HAPPEN: MORE THAN HALF THE DAYS

## 2018-05-02 ASSESSMENT — PATIENT HEALTH QUESTIONNAIRE - PHQ9: 5. POOR APPETITE OR OVEREATING: SEVERAL DAYS

## 2018-05-02 ASSESSMENT — PAIN SCALES - GENERAL: PAINLEVEL: NO PAIN (0)

## 2018-05-02 NOTE — PROGRESS NOTES
ANNUAL    Keira is a 27 year old  female who presents for annual exam.   Youngest Child: 3  Largest Child: 10  Gestational Diabetes: n Hypertension: n  No LMP recorded. Patient is not currently having periods (Reason: Irregular Periods).  Menses: 2 wks at a time  pain n Contraception nexplanon.  Planning pregnancy: n not having sex right now  Concerns in addition to routine health maintenance?  Bleeding.  RBAQ's  GYNECOLOGIC HISTORY:   Surgery: n  History sexually transmitted infections:No STD history   Safe?  y  STI testing offered?  y  History of abnormal Pap smear: y just ascus  Problems with sex? n  Family history of: breast cancer: n   Uterine cancer ?  ovarian cancer: ?   colon cancer: n    HEALTH MAINTENANCE:  Cholesterol: (No results found for: CHOL History of abnormal lipids: n  Mammo: n . History of abnormal Mammo:n   Regular Self Breast Exams: y Calcium/Vitamin D or Vitamin: y Exercise y    TSH: (  TSH   Date Value Ref Range Status   2016 1.83 0.40 - 4.00 mU/L Final    )  Pap; (  Lab Results   Component Value Date    PAP NIL 2017    PAP ASC-US 2016    PAP LSIL 2015    )    HISTORY:  Obstetric History       T1      L1     SAB0   TAB0   Ectopic0   Multiple0   Live Births1       # Outcome Date GA Lbr Martir/2nd Weight Sex Delivery Anes PTL Lv   2             1 Term 08/15/14 40w0d / 00:53 10 lb 4.7 oz (4.669 kg) M Vag-Spont EPI N NEHAL      Name: Sebastián      Apgar1:  8                Apgar5: 9        Past Medical History:   Diagnosis Date     Behcet's disease (H)      Past Surgical History:   Procedure Laterality Date     EXCISE MASS BACK Left 3/7/2016    Procedure: EXCISE MASS BACK;  Surgeon: Safia Garza MD;  Location: [facility]     Family History   Problem Relation Age of Onset     Hypertension Father      Nystagmus Son      Asthma Cousin      DIABETES No family hx of      Heart Failure Paternal Grandfather      HEART DISEASE Brother      SVT     Hypertension  Brother      Social History     Social History     Marital status: Single     Spouse name: N/A     Number of children: N/A     Years of education: N/A     Social History Main Topics     Smoking status: Former Smoker     Packs/day: 1.00     Years: 10.00     Quit date: 7/1/2013     Smokeless tobacco: Never Used     Alcohol use No     Drug use: No     Sexual activity: Yes     Partners: Male     Other Topics Concern     Parent/Sibling W/ Cabg, Mi Or Angioplasty Before 65f 55m? No      Service No     Blood Transfusions Yes     Permits if needed     Caffeine Concern No     Occupational Exposure No     Hobby Hazards No     Sleep Concern No     Stress Concern No     Weight Concern No     Special Diet No     Back Care No     Exercise No     Seat Belt Yes     Social History Narrative       Current Outpatient Prescriptions:      etonogestrel (IMPLANON/NEXPLANON) 68 MG IMPL, 1 each (68 mg) by Subdermal route continuous, Disp: , Rfl:      IBUPROFEN PO, Take 800 mg by mouth, Disp: , Rfl:      lidocaine, viscous, (XYLOCAINE) 2 % solution, Take by mouth every 2 hours as needed for moderate pain To mouth sores as needed. The patient has 4% solution., Disp: , Rfl:      predniSONE (DELTASONE) 20 MG tablet, Take two tablets (= 40mg) each day for 5 (five) days, Disp: 10 tablet, Rfl: 0     Allergies   Allergen Reactions     Banana Itching     Morphine      Headache and nausea     Tylenol W/Codeine [Acetaminophen-Codeine]        Past medical, surgical, social and family history were reviewed and updated in [facility].    ROS:    CONSTITUTIONAL:     NEGATIVE for fever, chills, change in weight  INTEGUMENTARY/SKIN:       NEGATIVE for worrisome rashes, moles or lesions  EYES:     NEGATIVE for vision changes or irritation  ENT/MOUTH: NEGATIVE for ear, mouth and throat problems  RESP:     NEGATIVE for significant cough or SOB  CV:   NEGATIVE for chest pain, palpitations or peripheral edema  GI:     NEGATIVE for nausea, abdominal pain,  "heartburn, or change in bowel habits  :   NEGATIVE for frequency, dysuria, hematuria, vaginal discharge, or irregular bleeding,incontinence   MUSCULOSKELETAL:     NEGATIVE for significant arthralgias or myalgia  NEURO:      NEGATIVE for weakness, dizziness or paresthesias  ENDOCRINE:      NEGATIVE for temperature intolerance, skin/hair changes  PSYCHIATRIC:      NEGATIVE for changes in mood or affect.     EXAM:   /68  Pulse 60  Ht 5' 7\" (1.702 m)  Wt 195 lb (88.5 kg)  BMI 30.54 kg/m2   BMI: There is no height or weight on file to calculate BMI.  Constitutional: healthy, alert and no distress  Head: Normocephalic. No masses, lesions, tenderness or abnormalities  Neck: Neck supple. Trachea midline. No adenopathy. Thyroid symmetric, normal size.   Cardiovascular: RRR.   Respiratory: lungs clear   Breast: Breasts reveal mild symmetric fibrocystic densities, but there are no dominant, discrete, fixed or suspicious masses found.  Gastrointestinal: Abdomen soft, non-tender, non-distended. No masses, organomegaly.  :  Vulva:  No external lesions, normal female hair distribution, no inguinal adenopathy.    Urethra:  Midline, non-tender, well supported, no discharge  Vagina:  Moist, pink, no abnormal discharge, no lesions  Cervix: clean   Uterus:   Normal size,  , non-tender, freely mobile  Ovaries:  No masses appreciated  Rectal Exam: not done  Musculoskeletal: extremities normal  Skin: no suspicious lesions or rashes  Psychiatric: Affect appropriate, cooperative,mentation appears normal.   Nexplanon in left arm without comps    COUNSELING:   regular exercise  healthy diet/nutrition  Immunizations   contraception  family planning  Folic Acid Counseling     reports that she quit smoking about 4 years ago. She has a 10.00 pack-year smoking history. She has never used smokeless tobacco.  Tobacco Cessation Action Plan: na  Body mass index is 30.54 kg/(m^2).  Weight management plan: lo gly  FRAX Risk " Assessment    ASSESSMENT:  27 year old female with satisfactory annual exam  Family planning  PLAN:   Pap,gc,ct  After care discussed  Check MIIC  Return to office: 1 year RE    Greater than 0 minutes spent discussing other than annual in addition to susana Han MD

## 2018-05-02 NOTE — NURSING NOTE
"Chief Complaint   Patient presents with     Gyn Exam       Initial /68  Pulse 60  Ht 5' 7\" (1.702 m)  Wt 195 lb (88.5 kg)  BMI 30.54 kg/m2 Estimated body mass index is 30.54 kg/(m^2) as calculated from the following:    Height as of this encounter: 5' 7\" (1.702 m).    Weight as of this encounter: 195 lb (88.5 kg).  Medication Reconciliation: matias Meneses      "

## 2018-05-02 NOTE — PATIENT INSTRUCTIONS
Pap and Gonorrhea and Chlamydia testing done.  Nexplanon removed.    Return for annual exam in 1 year with Chucho Barrera.

## 2018-05-02 NOTE — LETTER
May 9, 2018      Keira Qureshi  [address] [address]        Dear Keira,       Thank you for coming to the [facility]. This letter is to inform you that your pap test was normal. Please call the nurse at [phone] if you have questions pertaining to your results.         Sincerely,      Umm Han MD/Demetria MENDOZA LPN

## 2018-05-02 NOTE — MR AVS SNAPSHOT
"              After Visit Summary   5/2/2018    Keira Qureshi    MRN: [id number]           Patient Information     Date Of Birth          1990        Visit Information        Provider Department      5/2/2018 11:00 AM Umm Han MD [facility]        Today's Diagnoses     Routine general medical examination at a health care facility    -  1      Care Instructions    Pap and Gonorrhea and Chlamydia testing done.  Nexplanon removed.    Return for annual exam in 1 year with Chucho Barrera.             Follow-ups after your visit        Who to contact     If you have questions or need follow up information about today's clinic visit or your schedule please contact [facility] directly at [phone].  Normal or non-critical lab and imaging results will be communicated to you by MyChart, letter or phone within 4 business days after the clinic has received the results. If you do not hear from us within 7 days, please contact the clinic through [employer] or phone. If you have a critical or abnormal lab result, we will notify you by phone as soon as possible.  Submit refill requests through [employer] or call your pharmacy and they will forward the refill request to us. Please allow 3 business days for your refill to be completed.          Additional Information About Your Visit        MyChart Information     [employer] gives you secure access to your electronic health record. If you see a primary care provider, you can also send messages to your care team and make appointments. If you have questions, please call your primary care clinic.  If you do not have a primary care provider, please call [phone] and they will assist you.        Care EveryWhere ID     This is your Care EveryWhere ID. This could be used by other organizations to access your [facility] medical records  [id number]        Your Vitals Were     Pulse Height BMI (Body Mass Index)             60 5' 7\" (1.702 m) 30.54 " kg/m2          Blood Pressure from Last 3 Encounters:   05/02/18 100/68   01/27/18 127/69   12/13/17 107/62    Weight from Last 3 Encounters:   05/02/18 195 lb (88.5 kg)   04/19/17 198 lb (89.8 kg)   02/02/17 180 lb (81.6 kg)              We Performed the Following     A pap thin layer screen reflex to HPV if ASCUS - recommend age 25 - 29     GC/Chlamydia by PCR - [facility]     REMOVAL NEXPLANON        Primary Care Provider Office Phone # Fax #    Margarette JUSTIN Puri [phone]       [facility] [address] [address]        Equal Access to Services     RADHA SCHNEIDER : Hadii cecilia Cline, wasanjanada bjadaha, qaybta kaalmada manjinder, emir prado. So [facility] [phone].    ATENCIÓN: Si habla español, tiene a thorpe disposición servicios gratuitos de asistencia lingüística. Llame al [phone].    We comply with applicable federal civil rights laws and [place] laws. We do not discriminate on the basis of race, color, national origin, age, disability, sex, sexual orientation, or gender identity.            Thank you!     Thank you for choosing [facility]  for your care. Our goal is always to provide you with excellent care. Hearing back from our patients is one way we can continue to improve our services. Please take a few minutes to complete the written survey that you may receive in the mail after your visit with us. Thank you!             Your Updated Medication List - Protect others around you: Learn how to safely use, store and throw away your medicines at www.disposemymeds.org.          This list is accurate as of 5/2/18 11:59 AM.  Always use your most recent med list.                   Brand Name Dispense Instructions for use Diagnosis    IBUPROFEN PO      Take 800 mg by mouth        lidocaine (viscous) 2 % solution    XYLOCAINE     Take by mouth every 2 hours as needed for moderate pain To mouth sores as needed. The  patient has 4% solution.        predniSONE 20 MG tablet    DELTASONE    10 tablet    Take two tablets (= 40mg) each day for 5 (five) days

## 2018-05-03 ASSESSMENT — PATIENT HEALTH QUESTIONNAIRE - PHQ9: SUM OF ALL RESPONSES TO PHQ QUESTIONS 1-9: 6

## 2018-05-03 ASSESSMENT — ANXIETY QUESTIONNAIRES: GAD7 TOTAL SCORE: 10

## 2018-05-08 LAB
COPATH REPORT: NORMAL
PAP: NORMAL

## 2018-05-29 ENCOUNTER — TELEPHONE (OUTPATIENT)
Dept: OBGYN | Facility: OTHER | Age: 28
End: 2018-05-29

## 2018-05-29 DIAGNOSIS — Z32.01 PREGNANCY TEST POSITIVE: Primary | ICD-10-CM

## 2018-05-29 NOTE — TELEPHONE ENCOUNTER
Patient called and stated she took 2 pg tests and they bother were positive. Would like to see Chucho Barrera.    [facility] will [facility] her today other tomorrow. She was ok with that.     Jany please call patient thank you.

## 2018-05-29 NOTE — TELEPHONE ENCOUNTER
Chucho Barrera Patient:     Positive pregnancy test : Yes, last night and today   Last Annual/ Pap: 5/2/2018  G:3    P:1  Vaginal or C/S:vaginal   LMP : end of April  GA: 4-5 wks   Prenatal vitamins?: No, Rx to [facility]   Bleeding?: No  Cramping?: Yes, less than period like cramping   1-sided pelvic pain?: Pt stated cramping was more one sided but has gone away.    Advised patient to be seen ASAP if any of the above symptoms.    Pt requested an US with radiology in the hospital. US order pended.     NewOB appt scheduled with Chucho on- Pt has to check schedule and will schedule appt after US.

## 2018-05-30 ENCOUNTER — HOSPITAL ENCOUNTER (OUTPATIENT)
Dept: ULTRASOUND IMAGING | Facility: HOSPITAL | Age: 28
Discharge: HOME | End: 2018-05-30
Attending: ADVANCED PRACTICE MIDWIFE | Admitting: ADVANCED PRACTICE MIDWIFE
Payer: COMMERCIAL

## 2018-05-30 DIAGNOSIS — Z32.01 PREGNANCY TEST POSITIVE: ICD-10-CM

## 2018-05-30 PROCEDURE — 76801 OB US < 14 WKS SINGLE FETUS: CPT | Mod: TC

## 2018-05-30 RX ORDER — VITAMIN A ACETATE, BETA CAROTENE, ASCORBIC ACID, CHOLECALCIFEROL, .ALPHA.-TOCOPHEROL ACETATE, DL-, THIAMINE MONONITRATE, RIBOFLAVIN, NIACINAMIDE, PYRIDOXINE HYDROCHLORIDE, FOLIC ACID, CYANOCOBALAMIN, CALCIUM CARBONATE, FERROUS FUMARATE, ZINC OXIDE, CUPRIC OXIDE 3080; 12; 120; 400; 1; 1.84; 3; 20; 22; 920; 25; 200; 27; 10; 2 [IU]/1; UG/1; MG/1; [IU]/1; MG/1; MG/1; MG/1; MG/1; MG/1; [IU]/1; MG/1; MG/1; MG/1; MG/1; MG/1
1 TABLET, FILM COATED ORAL DAILY
Qty: 100 TABLET | Refills: 5 | Status: SHIPPED | OUTPATIENT
Start: 2018-05-30

## 2018-05-31 ENCOUNTER — TELEPHONE (OUTPATIENT)
Dept: OBGYN | Facility: OTHER | Age: 28
End: 2018-05-31

## 2018-05-31 DIAGNOSIS — Z32.01 PREGNANCY TEST POSITIVE: Primary | ICD-10-CM

## 2018-05-31 DIAGNOSIS — Z32.01 PREGNANCY TEST POSITIVE: ICD-10-CM

## 2018-05-31 LAB — B-HCG SERPL-ACNC: 102 IU/L (ref 0–5)

## 2018-05-31 PROCEDURE — 36415 COLL VENOUS BLD VENIPUNCTURE: CPT | Mod: ZL | Performed by: ADVANCED PRACTICE MIDWIFE

## 2018-05-31 PROCEDURE — 84702 CHORIONIC GONADOTROPIN TEST: CPT | Mod: ZL,59 | Performed by: ADVANCED PRACTICE MIDWIFE

## 2018-05-31 NOTE — TELEPHONE ENCOUNTER
Pt notified of US results.    No gestational sac visualized.    Pt denies any pain or bleeding.    Plan:    Quantitative hCG today and Monday.  Pt instructed to seek immediate medical attention for bleeding or pain.  Voiced understanding.    Will recheck with US if Quant > 2000.

## 2018-06-04 ENCOUNTER — APPOINTMENT (OUTPATIENT)
Dept: LAB | Facility: OTHER | Age: 28
End: 2018-06-04
Attending: ADVANCED PRACTICE MIDWIFE
Payer: COMMERCIAL

## 2018-06-04 DIAGNOSIS — Z32.01 PREGNANCY TEST POSITIVE: Primary | ICD-10-CM

## 2018-06-04 LAB — B-HCG SERPL-ACNC: 766 IU/L (ref 0–5)

## 2018-06-04 PROCEDURE — 36415 COLL VENOUS BLD VENIPUNCTURE: CPT | Mod: ZL,59 | Performed by: ADVANCED PRACTICE MIDWIFE

## 2018-06-04 PROCEDURE — 84702 CHORIONIC GONADOTROPIN TEST: CPT | Mod: ZL | Performed by: ADVANCED PRACTICE MIDWIFE

## 2018-06-06 DIAGNOSIS — Z32.01 PREGNANCY TEST POSITIVE: ICD-10-CM

## 2018-06-06 DIAGNOSIS — Z32.01 PREGNANCY TEST POSITIVE: Primary | ICD-10-CM

## 2018-06-06 LAB — B-HCG SERPL-ACNC: 2152 IU/L (ref 0–5)

## 2018-06-06 PROCEDURE — 36415 COLL VENOUS BLD VENIPUNCTURE: CPT | Mod: ZL,59 | Performed by: ADVANCED PRACTICE MIDWIFE

## 2018-06-06 PROCEDURE — 84702 CHORIONIC GONADOTROPIN TEST: CPT | Mod: ZL,59 | Performed by: ADVANCED PRACTICE MIDWIFE

## 2018-06-07 ENCOUNTER — HOSPITAL ENCOUNTER (OUTPATIENT)
Dept: ULTRASOUND IMAGING | Facility: HOSPITAL | Age: 28
Discharge: HOME | End: 2018-06-07
Attending: ADVANCED PRACTICE MIDWIFE | Admitting: ADVANCED PRACTICE MIDWIFE
Payer: COMMERCIAL

## 2018-06-07 DIAGNOSIS — Z32.01 PREGNANCY TEST POSITIVE: ICD-10-CM

## 2018-06-07 PROCEDURE — 76801 OB US < 14 WKS SINGLE FETUS: CPT | Mod: TC

## 2018-06-08 DIAGNOSIS — Z32.01 PREGNANCY TEST POSITIVE: Primary | ICD-10-CM

## 2018-06-14 ENCOUNTER — HOSPITAL ENCOUNTER (OUTPATIENT)
Dept: ULTRASOUND IMAGING | Facility: HOSPITAL | Age: 28
Discharge: HOME | End: 2018-06-14
Attending: ADVANCED PRACTICE MIDWIFE | Admitting: ADVANCED PRACTICE MIDWIFE
Payer: COMMERCIAL

## 2018-06-14 DIAGNOSIS — Z32.01 PREGNANCY TEST POSITIVE: ICD-10-CM

## 2018-06-14 PROCEDURE — 76801 OB US < 14 WKS SINGLE FETUS: CPT | Mod: TC

## 2018-06-15 ENCOUNTER — TELEPHONE (OUTPATIENT)
Dept: OBGYN | Facility: OTHER | Age: 28
End: 2018-06-15

## 2018-06-15 DIAGNOSIS — Z32.01 PREGNANCY TEST POSITIVE: Primary | ICD-10-CM

## 2018-06-15 NOTE — TELEPHONE ENCOUNTER
Notified Pt of results of US on 6/14. Chucho's recommendations: repeat US on 6/21 and see her in office on 6/18. Pt given ectopic warnings and was OK with plan.

## 2018-06-18 ENCOUNTER — PRENATAL OFFICE VISIT (OUTPATIENT)
Dept: OBGYN | Facility: OTHER | Age: 28
End: 2018-06-18
Attending: ADVANCED PRACTICE MIDWIFE
Payer: COMMERCIAL

## 2018-06-18 VITALS
OXYGEN SATURATION: 96 % | SYSTOLIC BLOOD PRESSURE: 102 MMHG | BODY MASS INDEX: 30.61 KG/M2 | HEIGHT: 67 IN | DIASTOLIC BLOOD PRESSURE: 68 MMHG | WEIGHT: 195 LBS | HEART RATE: 68 BPM

## 2018-06-18 DIAGNOSIS — Z32.01 PREGNANCY TEST POSITIVE: Primary | ICD-10-CM

## 2018-06-18 LAB — B-HCG SERPL-ACNC: ABNORMAL IU/L (ref 0–5)

## 2018-06-18 PROCEDURE — 99212 OFFICE O/P EST SF 10 MIN: CPT | Performed by: ADVANCED PRACTICE MIDWIFE

## 2018-06-18 PROCEDURE — G0463 HOSPITAL OUTPT CLINIC VISIT: HCPCS

## 2018-06-18 PROCEDURE — 36415 COLL VENOUS BLD VENIPUNCTURE: CPT | Mod: ZL | Performed by: ADVANCED PRACTICE MIDWIFE

## 2018-06-18 PROCEDURE — 84702 CHORIONIC GONADOTROPIN TEST: CPT | Mod: ZL | Performed by: ADVANCED PRACTICE MIDWIFE

## 2018-06-18 ASSESSMENT — PAIN SCALES - GENERAL: PAINLEVEL: NO PAIN (0)

## 2018-06-18 NOTE — PROGRESS NOTES
"Keira Qureshi is a 27 year old female  Here today to review labs and US for early positive pregnancy test.      O:   /68 (BP Location: Left arm, Patient Position: Sitting, Cuff Size: Adult Regular)  Pulse 68  Ht 5' 7\" (1.702 m)  Wt 195 lb (88.5 kg)  LMP 04/23/2018  SpO2 96%  BMI 30.54 kg/m2   Pleasant without acute distress    A:  Positive pregnancy test  Rising hCG quants    P:    HCG quantitative  US scheduled for Thursday    Greater than 10 minutes were spent face to face counseling this patient on lab and US results and plan of care.    Chucho Barrera, ARMIDA, CNM    "

## 2018-06-18 NOTE — NURSING NOTE
"Chief Complaint   Patient presents with     Follow Up For     u/s follow up       Initial /68 (BP Location: Left arm, Patient Position: Sitting, Cuff Size: Adult Regular)  Pulse 68  Ht 5' 7\" (1.702 m)  Wt 195 lb (88.5 kg)  LMP 04/23/2018  SpO2 96%  BMI 30.54 kg/m2 Estimated body mass index is 30.54 kg/(m^2) as calculated from the following:    Height as of this encounter: 5' 7\" (1.702 m).    Weight as of this encounter: 195 lb (88.5 kg).  Medication Reconciliation: complete    Inge Phan LPN    "

## 2018-06-18 NOTE — PATIENT INSTRUCTIONS
Return to office as scheduled and as needed.    Thank you for allowing Chucho HUFFMAN CNM and our OB team to participate in your care.  If you have a scheduling or an appointment question please contact [facility] Coordinator at their direct line [phone].   ALL nursing questions or concerns can be directed to your OB nurse at: [phone] Marla Carmichael/Jen

## 2018-06-18 NOTE — MR AVS SNAPSHOT
After Visit Summary   6/18/2018    Keira Qureshi    MRN: [id number]           Patient Information     Date Of Birth          1990        Visit Information        Provider Department      6/18/2018 9:00 AM Chucho Barrera APRN CNM [facility]        Today's Diagnoses     Pregnancy test positive    -  1      Care Instructions    Return to office as scheduled and as needed.    Thank you for allowing Chucho HUFFMAN CNM and our OB team to participate in your care.  If you have a scheduling or an appointment question please contact [facility] Coordinator at their direct line [phone].   ALL nursing questions or concerns can be directed to your OB nurse at: [phone] Marla Carmichael/Jen               Follow-ups after your visit        Your next 10 appointments already scheduled     Jun 21, 2018 10:45 AM CDT   US OB < 14 WEEKS SINGLE with HIUS2   HI ULTRASOUND ([facility] )    [address] [facility] [address] [phone]           Please bring a list of your medicines (including vitamins, minerals and over-the-counter drugs). Also, tell your doctor about any allergies you may have. Wear comfortable clothes and leave your valuables at home.  If you re less than 20 weeks drink four 8-ounce glasses of fluid an hour before your exam. If you need to empty your bladder before your exam, try to release only a little urine. Then, drink another glass of fluid.  You may have up to two family members in the exam room. If you bring a small child, an adult must be there to care for him or her.  Please call the Imaging Department at your exam site with any questions.            May 06, 2019  2:30 PM CDT   (Arrive by 2:15 PM)   PHYSICAL with ARMIDA Morales CNM   [facility] ([facility] )    [address]  [facility] [address] [phone]              Who to contact     If you have questions or need follow up information about  "today's clinic visit or your schedule please contact [facility] directly at [phone].  Normal or non-critical lab and imaging results will be communicated to you by MyChart, letter or phone within 4 business days after the clinic has received the results. If you do not hear from us within 7 days, please contact the clinic through [employer] or phone. If you have a critical or abnormal lab result, we will notify you by phone as soon as possible.  Submit refill requests through [employer] or call your pharmacy and they will forward the refill request to us. Please allow 3 business days for your refill to be completed.          Additional Information About Your Visit        [employer] Information     [employer] gives you secure access to your electronic health record. If you see a primary care provider, you can also send messages to your care team and make appointments. If you have questions, please call your primary care clinic.  If you do not have a primary care provider, please call [phone] and they will assist you.        Care EveryWhere ID     This is your Care EveryWhere ID. This could be used by other organizations to access your [facility] medical records  [id number]        Your Vitals Were     Pulse Height Last Period Pulse Oximetry BMI (Body Mass Index)       68 5' 7\" (1.702 m) 04/23/2018 96% 30.54 kg/m2        Blood Pressure from Last 3 Encounters:   06/18/18 102/68   05/02/18 100/68   01/27/18 127/69    Weight from Last 3 Encounters:   06/18/18 195 lb (88.5 kg)   05/02/18 195 lb (88.5 kg)   04/19/17 198 lb (89.8 kg)              We Performed the Following     HCG quantitative pregnancy        Primary Care Provider Office Phone # Fax #    Margarette Puri [phone]       [facility] [address] [address]        Equal Access to Services     RADHA SCHNEIDER AH: Jessica Cline, waaxda luqadaha, qaybta kaalmada gloriaegyagisela, emir mccall " jillian castrokelly jakymagali lacrtanesha ah. So [facility] [phone].    ATENCIÓN: Si habla taran, tiene a thorpe disposición servicios gratuitos de asistencia lingüística. Halle al [phone].    We comply with applicable federal civil rights laws and [place] laws. We do not discriminate on the basis of race, color, national origin, age, disability, sex, sexual orientation, or gender identity.            Thank you!     [facility] you for [facility]  for your care. Our goal is always to provide you with excellent care. Hearing back from our patients is one way we can continue to improve our services. Please take a few minutes to complete the written survey that you may receive in the mail after your visit with us. Thank you!             Your Updated Medication List - Protect others around you: Learn how to safely use, store and throw away your medicines at www.disposemymeds.org.          This list is accurate as of 6/18/18  1:29 PM.  Always use your most recent med list.                   Brand Name Dispense Instructions for use Diagnosis    IBUPROFEN PO      Take 800 mg by mouth        lidocaine (viscous) 2 % solution    XYLOCAINE     Take by mouth every 2 hours as needed for moderate pain To mouth sores as needed. The patient has 4% solution.        PRENATAL PLUS 27-1 MG Tabs     100 tablet    Take 1 tablet by mouth daily    Pregnancy test positive

## 2018-06-21 ENCOUNTER — HOSPITAL ENCOUNTER (OUTPATIENT)
Dept: ULTRASOUND IMAGING | Facility: HOSPITAL | Age: 28
Discharge: HOME | End: 2018-06-21
Attending: ADVANCED PRACTICE MIDWIFE | Admitting: ADVANCED PRACTICE MIDWIFE
Payer: COMMERCIAL

## 2018-06-21 DIAGNOSIS — Z32.01 PREGNANCY TEST POSITIVE: ICD-10-CM

## 2018-06-21 PROCEDURE — 76801 OB US < 14 WKS SINGLE FETUS: CPT | Mod: TC

## 2018-06-25 ENCOUNTER — TELEPHONE (OUTPATIENT)
Dept: OBGYN | Facility: OTHER | Age: 28
End: 2018-06-25

## 2018-06-25 DIAGNOSIS — O99.612 CONSTIPATION DURING PREGNANCY IN SECOND TRIMESTER: Primary | ICD-10-CM

## 2018-06-25 DIAGNOSIS — K59.00 CONSTIPATION DURING PREGNANCY IN SECOND TRIMESTER: Primary | ICD-10-CM

## 2018-06-25 RX ORDER — ASPIRIN 81 MG
TABLET, DELAYED RELEASE (ENTERIC COATED) ORAL
Qty: 60 TABLET | Refills: 1 | Status: SHIPPED | OUTPATIENT
Start: 2018-06-25

## 2020-03-02 ENCOUNTER — HEALTH MAINTENANCE LETTER (OUTPATIENT)
Age: 30
End: 2020-03-02